# Patient Record
Sex: FEMALE | Race: WHITE | NOT HISPANIC OR LATINO | Employment: FULL TIME | URBAN - METROPOLITAN AREA
[De-identification: names, ages, dates, MRNs, and addresses within clinical notes are randomized per-mention and may not be internally consistent; named-entity substitution may affect disease eponyms.]

---

## 2017-03-25 ENCOUNTER — OFFICE VISIT (OUTPATIENT)
Dept: URGENT CARE | Age: 27
End: 2017-03-25
Payer: COMMERCIAL

## 2017-03-25 PROCEDURE — 99203 OFFICE O/P NEW LOW 30 MIN: CPT | Performed by: FAMILY MEDICINE

## 2017-03-25 PROCEDURE — S9088 SERVICES PROVIDED IN URGENT: HCPCS | Performed by: FAMILY MEDICINE

## 2017-06-02 ENCOUNTER — OFFICE VISIT (OUTPATIENT)
Dept: URGENT CARE | Age: 27
End: 2017-06-02
Payer: COMMERCIAL

## 2017-06-02 PROCEDURE — 99213 OFFICE O/P EST LOW 20 MIN: CPT | Performed by: FAMILY MEDICINE

## 2017-06-02 PROCEDURE — S9088 SERVICES PROVIDED IN URGENT: HCPCS | Performed by: FAMILY MEDICINE

## 2017-08-09 ENCOUNTER — APPOINTMENT (OUTPATIENT)
Dept: LAB | Facility: CLINIC | Age: 27
End: 2017-08-09
Payer: COMMERCIAL

## 2017-08-09 ENCOUNTER — ALLSCRIPTS OFFICE VISIT (OUTPATIENT)
Dept: OTHER | Facility: OTHER | Age: 27
End: 2017-08-09

## 2017-08-09 ENCOUNTER — TRANSCRIBE ORDERS (OUTPATIENT)
Dept: LAB | Facility: CLINIC | Age: 27
End: 2017-08-09

## 2017-08-09 DIAGNOSIS — Z00.8 HEALTH EXAMINATION IN POPULATION SURVEY: Primary | ICD-10-CM

## 2017-08-09 DIAGNOSIS — Z00.8 HEALTH EXAMINATION IN POPULATION SURVEY: ICD-10-CM

## 2017-08-09 LAB
CHOLEST SERPL-MCNC: 184 MG/DL (ref 50–200)
EST. AVERAGE GLUCOSE BLD GHB EST-MCNC: 111 MG/DL
HBA1C MFR BLD: 5.5 % (ref 4.2–6.3)
HDLC SERPL-MCNC: 84 MG/DL (ref 40–60)
LDLC SERPL CALC-MCNC: 95 MG/DL (ref 0–100)
TRIGL SERPL-MCNC: 27 MG/DL

## 2017-08-09 PROCEDURE — 83036 HEMOGLOBIN GLYCOSYLATED A1C: CPT

## 2017-08-09 PROCEDURE — 80061 LIPID PANEL: CPT

## 2017-08-09 PROCEDURE — 36415 COLL VENOUS BLD VENIPUNCTURE: CPT

## 2017-10-10 ENCOUNTER — ALLSCRIPTS OFFICE VISIT (OUTPATIENT)
Dept: OTHER | Facility: OTHER | Age: 27
End: 2017-10-10

## 2017-10-11 NOTE — PROGRESS NOTES
Assessment  1  Superficial laceration of face (910 8) (S01 81XA)    Discussion/Summary    laceration right face (just lateral to eye)  No sutures needed, steri strip only  Call for S/Sx infection, ongoing issues  Chief Complaint  Pt here c/o R eye injury      History of Present Illness  HPI: in appointment  Was rushing around too quickly this morning after she got out of bed, and accidentally hit the corner of her right eye on the edge of her dresser  Slight bleeding initially, fine since, but wanted to get it looked at just in case  Did not fall to ground or lose consciousness  No vision changes  Review of Systems    Integumentary: as noted in HPI  Active Problems  1  History of Erythema migrans (Lyme disease) (088 81) (A69 20)   2  Seasonal allergies (477 9) (J30 2)   3  Well female exam with routine gynecological exam (V72 31) (Z01 419)    Past Medical History  1  History of Erythema migrans (Lyme disease) (088 81) (A69 20)   2  History of acute pharyngitis (V12 69) (Z87 09)   3  History of postnasal drip (V12 69) (Z87 09)   4  History of Hordeolum of right eye (373 11) (H00 013)   5  History of Insect bite of upper extremity, right, subsequent encounter (V58 89,919 4)   (Z90 232D,M15  XXXD)   6  History of Need for MMR vaccine (V06 4) (Z23)   7  History of Pneumonia (V12 61)   8  Superficial laceration of face (910 8) (S01 81XA)   9  History of Swelling of eyelid, right (374 82) (A65 763)  Active Problems And Past Medical History Reviewed: The active problems and past medical history were reviewed and updated today  Family History  Father    1  Family history of Hypertension (V17 49)  Maternal Grandmother    2  Family history of skin cancer (V16 8) (Z80 8)   3  Family history of Hypertension (V17 49)   4  Family history of Lung Cancer (V16 1)  Maternal Grandfather    5  Family history of Laryngeal Cancer (V16 2)  Paternal Grandfather    10   Family history of lung cancer (V16 1) (Z80 1) 7  Family history of Hypertension (V17 49)    Social History   · Denied: History of Drug Use   · Exercises regularly   · Never A Smoker   · 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[employer]  · Social alcohol use (Z78 9)    Surgical History  1  History of Oral Surgery Tooth Extraction [facility]    Current Meds   1  No Reported Medications  Requested for: [id number] Recorded    Allergies  1  No Known Drug Allergies  2  No Known Environmental Allergies   3  No Known Food Allergies    Vitals   Recorded: [id number] 09:52AM   Temperature 98 7 F, Tympanic   Heart Rate 75   Systolic 933   Diastolic 72   Weight 091 lb    BMI Calculated 22 73   BSA Calculated 1 93   O2 Saturation 98     Physical Exam    Constitutional   General appearance: No acute distress, well appearing and well nourished  Head and Face   Head and face: Abnormal  -Right side of face, just lateral to right eye, with small (1 cm) very superficial laceration, without skin separation  Edges well approximated  Eyes   Conjunctiva and lids: No swelling, erythema or discharge  -Right eye with nontender, normal appearing lids  No tenderness/bruising to orbit, cheek  Pupils and irises: Equal, round, reactive to light  -Right eye with no signs of injury to cornea  No globe tenderness        Signatures   Electronically signed by : Marcellus Morrow; Oct 10 2017 10:14AM EST                       (Author)    Electronically signed by : Karen Chaney DO; Oct 10 2017 10:31AM EST                       (Author)

## 2018-01-14 VITALS
OXYGEN SATURATION: 98 % | SYSTOLIC BLOOD PRESSURE: 118 MMHG | DIASTOLIC BLOOD PRESSURE: 72 MMHG | TEMPERATURE: 98.7 F | HEART RATE: 75 BPM | WEIGHT: 163 LBS | BODY MASS INDEX: 22.73 KG/M2

## 2018-01-26 ENCOUNTER — OFFICE VISIT (OUTPATIENT)
Dept: FAMILY MEDICINE CLINIC | Facility: OTHER | Age: 28
End: 2018-01-26
Payer: COMMERCIAL

## 2018-01-26 VITALS
DIASTOLIC BLOOD PRESSURE: 78 MMHG | OXYGEN SATURATION: 99 % | SYSTOLIC BLOOD PRESSURE: 108 MMHG | BODY MASS INDEX: 22.48 KG/M2 | WEIGHT: 160.56 LBS | TEMPERATURE: 99.1 F | HEART RATE: 85 BPM | HEIGHT: 71 IN

## 2018-01-26 DIAGNOSIS — R22.2 NODULE OF CHEST WALL: Primary | ICD-10-CM

## 2018-01-26 PROCEDURE — 99213 OFFICE O/P EST LOW 20 MIN: CPT | Performed by: NURSE PRACTITIONER

## 2018-01-26 PROCEDURE — 3008F BODY MASS INDEX DOCD: CPT | Performed by: NURSE PRACTITIONER

## 2018-01-26 NOTE — PROGRESS NOTES
Assessment/Plan:         Diagnoses and all orders for this visit:    Nodule of chest wall  - Suspected benign (sebaceous) cyst, but offered ultrasound as reassurance  Otherwise, call if increases in size, tenderness, overlying skin changes  -  US abdomen limited; Future        Subjective:      Patient ID: Lurdes Orellana is a 33 y o  female  Here with complaints of small lump beneath skin overlying left lower anterior rib  First noticed a month ago  No known precipitating injury  Mildly tender at times  Persistent, no change in size noted  No overlying skin changes or drainage  No ice or heat applied  Had small cyst on her right collarbone a few years ago  No previous skin issues  The following portions of the patient's history were reviewed and updated as appropriate: allergies, current medications, past family history, past medical history, past social history, past surgical history and problem list     Review of Systems   Constitutional: Negative for fever  HENT: Negative for sore throat  Gastrointestinal: Negative for abdominal pain  Skin:        Per HPI         Objective:     Physical Exam   Constitutional: She appears well-developed and well-nourished  Abdominal: Soft  There is no tenderness     Skin:

## 2018-08-15 ENCOUNTER — APPOINTMENT (OUTPATIENT)
Dept: LAB | Facility: CLINIC | Age: 28
End: 2018-08-15
Payer: COMMERCIAL

## 2018-08-15 ENCOUNTER — TRANSCRIBE ORDERS (OUTPATIENT)
Dept: LAB | Facility: CLINIC | Age: 28
End: 2018-08-15

## 2018-08-15 ENCOUNTER — ANNUAL EXAM (OUTPATIENT)
Dept: OBGYN CLINIC | Facility: CLINIC | Age: 28
End: 2018-08-15
Payer: COMMERCIAL

## 2018-08-15 VITALS
HEIGHT: 71 IN | DIASTOLIC BLOOD PRESSURE: 70 MMHG | SYSTOLIC BLOOD PRESSURE: 118 MMHG | WEIGHT: 150.4 LBS | BODY MASS INDEX: 21.06 KG/M2

## 2018-08-15 DIAGNOSIS — Z00.8 HEALTH EXAMINATION IN POPULATION SURVEY: Primary | ICD-10-CM

## 2018-08-15 DIAGNOSIS — Z01.419 WELL WOMAN EXAM WITH ROUTINE GYNECOLOGICAL EXAM: Primary | ICD-10-CM

## 2018-08-15 DIAGNOSIS — Z00.8 HEALTH EXAMINATION IN POPULATION SURVEY: ICD-10-CM

## 2018-08-15 LAB
CHOLEST SERPL-MCNC: 176 MG/DL (ref 50–200)
EST. AVERAGE GLUCOSE BLD GHB EST-MCNC: 103 MG/DL
HBA1C MFR BLD: 5.2 % (ref 4.2–6.3)
HDLC SERPL-MCNC: 91 MG/DL (ref 40–60)
LDLC SERPL CALC-MCNC: 68 MG/DL (ref 0–100)
NONHDLC SERPL-MCNC: 85 MG/DL
TRIGL SERPL-MCNC: 83 MG/DL

## 2018-08-15 PROCEDURE — 99395 PREV VISIT EST AGE 18-39: CPT | Performed by: OBSTETRICS & GYNECOLOGY

## 2018-08-15 PROCEDURE — 83036 HEMOGLOBIN GLYCOSYLATED A1C: CPT

## 2018-08-15 PROCEDURE — 80061 LIPID PANEL: CPT

## 2018-08-15 PROCEDURE — 36415 COLL VENOUS BLD VENIPUNCTURE: CPT

## 2018-08-15 RX ORDER — MULTIVITAMIN
1 CAPSULE ORAL DAILY
COMMUNITY
End: 2022-05-25

## 2018-08-15 NOTE — PROGRESS NOTES
ASSESSMENT & PLAN: Reinier Duke is a 33 y o  Jennelle Rinne with normal gynecologic exam     1   Routine well woman exam done today  2  Pap:  The patient's last pap was   Pap was not done today  Current ASCCP Guidelines reviewed  3   STD testing  was not performed  She has never been sexually active  4  Gardasil recommendations reviewed and never received   5  The following were reviewed in today's visit: breast self exam, adequate intake of calcium and vitamin D, exercise and healthy diet  6  Patient ran a [employer] last september    CC:  Annual Gynecologic Examination    HPI: Reinier Duke is a 33 y o  Jennelle Rinne who presents for annual gynecologic examination  She has the following concerns:  none    Health Maintenance:    She exercises 7 days per week with cardio  She wears her seatbelt routinely  She does perform regular monthly self breast exams  She feels safe at home  Patients does not follow a regular diet  Her last pap was     Past Medical History:   Diagnosis Date    Acute pharyngitis     Last assessed 17    Hordeolum of right eye     Last assessed 17    Lyme disease     Lasst assessed 09/03/15    Postnasal drip     Lasst assessed 17       Past Surgical History:   Procedure Laterality Date    WISDOM TOOTH EXTRACTION         Past OB/Gyn History:  OB History      Para Term  AB Living    0 0 0 0 0 0    SAB TAB Ectopic Multiple Live Births    0 0 0 0 0      ,  Period Cycle (Days): 28  Period Duration (Days): 5  Period Pattern: Regular  Menstrual Flow: Moderate  Menstrual Control: Tampon  Dysmenorrhea: (!) Mild  Dysmenorrhea Symptoms: CrampingPatient's last menstrual period was 2018  History of sexually transmitted infection No  History of abnormal pap smears  No     Patient is not currently sexually active         Family History   Problem Relation Age of Onset    Hypertension Father     Skin cancer Maternal Grandmother     Hypertension Maternal Grandmother     Lung cancer Maternal Grandmother     Other Maternal Grandfather         Laryngeal cancer    Lung cancer Paternal Grandfather     Hypertension Paternal Grandfather     No Known Problems Mother        Social History:  Social History     Social History    Marital status: Single     Spouse name: N/A    Number of children: N/A    Years of education: N/A     Occupational History    Physical Therapist      Social History Main Topics    Smoking status: Never Smoker    Smokeless tobacco: Never Used    Alcohol use Yes      Comment: social    Drug use: No    Sexual activity: No     Other Topics Concern    Not on file     Social History Narrative    Exercise regularly     Presently lives with parents  Patient is single  Patient is currently employed [employer], physical therapy    No Known Allergies    Current Outpatient Prescriptions:     Multiple Vitamin (MULTIVITAMIN) capsule, Take 1 capsule by mouth daily, Disp: , Rfl:     Review of Systems:  A complete review of systems was performed and was negative, except as listed  none    Physical Exam:  /70 (BP Location: Left arm, Patient Position: Sitting, Cuff Size: Standard)   Ht 5' 11" (1 803 m)   Wt 68 2 kg (150 lb 6 4 oz)   LMP 08/11/2018   BMI 20 98 kg/m²    GEN: The patient was alert and oriented x3, pleasant well-appearing female in no acute distress  HEENT:  Unremarkable, no anterior or posterior lymphadenopathy, no thyromegaly  CV:  RRR, no murmurs  RESP:  Clear to auscultation bilaterally  BREAST:  Symmetric breasts with no palpable breast masses or obvious breast lesions  She has no retractions or nipple discharge  She has no axillary abnormalities or palpable masses  Self breast exam is taught    ABD:  Soft, nontender, nondistended, normoactive bowel sounds,   EXT:  WWP, nontender, no edema  BACK:  No CVA tenderness, no tenderness to palpation along spine  PELVIC:  Normal appearing external female genitalia, deferred internal exam  Never sexually active

## 2019-08-14 ENCOUNTER — OFFICE VISIT (OUTPATIENT)
Dept: FAMILY MEDICINE CLINIC | Facility: OTHER | Age: 29
End: 2019-08-14
Payer: COMMERCIAL

## 2019-08-14 ENCOUNTER — APPOINTMENT (OUTPATIENT)
Dept: LAB | Facility: CLINIC | Age: 29
End: 2019-08-14
Payer: COMMERCIAL

## 2019-08-14 VITALS
TEMPERATURE: 98 F | HEART RATE: 50 BPM | WEIGHT: 159.2 LBS | OXYGEN SATURATION: 97 % | HEIGHT: 71 IN | BODY MASS INDEX: 22.29 KG/M2 | DIASTOLIC BLOOD PRESSURE: 62 MMHG | SYSTOLIC BLOOD PRESSURE: 110 MMHG

## 2019-08-14 DIAGNOSIS — R53.82 CHRONIC FATIGUE: ICD-10-CM

## 2019-08-14 DIAGNOSIS — L65.9 HAIR LOSS: ICD-10-CM

## 2019-08-14 DIAGNOSIS — R53.82 CHRONIC FATIGUE: Primary | ICD-10-CM

## 2019-08-14 LAB
ALBUMIN SERPL BCP-MCNC: 4 G/DL (ref 3.5–5)
ALP SERPL-CCNC: 35 U/L (ref 46–116)
ALT SERPL W P-5'-P-CCNC: 27 U/L (ref 12–78)
ANION GAP SERPL CALCULATED.3IONS-SCNC: 6 MMOL/L (ref 4–13)
AST SERPL W P-5'-P-CCNC: 24 U/L (ref 5–45)
BASOPHILS # BLD AUTO: 0.04 THOUSANDS/ΜL (ref 0–0.1)
BASOPHILS NFR BLD AUTO: 1 % (ref 0–1)
BILIRUB SERPL-MCNC: 0.7 MG/DL (ref 0.2–1)
BUN SERPL-MCNC: 15 MG/DL (ref 5–25)
CALCIUM SERPL-MCNC: 9.4 MG/DL (ref 8.3–10.1)
CHLORIDE SERPL-SCNC: 100 MMOL/L (ref 100–108)
CO2 SERPL-SCNC: 28 MMOL/L (ref 21–32)
CREAT SERPL-MCNC: 0.7 MG/DL (ref 0.6–1.3)
EOSINOPHIL # BLD AUTO: 0.05 THOUSAND/ΜL (ref 0–0.61)
EOSINOPHIL NFR BLD AUTO: 1 % (ref 0–6)
ERYTHROCYTE [DISTWIDTH] IN BLOOD BY AUTOMATED COUNT: 12.8 % (ref 11.6–15.1)
FERRITIN SERPL-MCNC: 29 NG/ML (ref 8–388)
GFR SERPL CREATININE-BSD FRML MDRD: 118 ML/MIN/1.73SQ M
GLUCOSE SERPL-MCNC: 76 MG/DL (ref 65–140)
HCT VFR BLD AUTO: 39.5 % (ref 34.8–46.1)
HGB BLD-MCNC: 12.7 G/DL (ref 11.5–15.4)
IMM GRANULOCYTES # BLD AUTO: 0.02 THOUSAND/UL (ref 0–0.2)
IMM GRANULOCYTES NFR BLD AUTO: 0 % (ref 0–2)
IRON SATN MFR SERPL: 40 %
IRON SERPL-MCNC: 146 UG/DL (ref 50–170)
LYMPHOCYTES # BLD AUTO: 2.03 THOUSANDS/ΜL (ref 0.6–4.47)
LYMPHOCYTES NFR BLD AUTO: 29 % (ref 14–44)
MCH RBC QN AUTO: 29.5 PG (ref 26.8–34.3)
MCHC RBC AUTO-ENTMCNC: 32.2 G/DL (ref 31.4–37.4)
MCV RBC AUTO: 92 FL (ref 82–98)
MONOCYTES # BLD AUTO: 0.52 THOUSAND/ΜL (ref 0.17–1.22)
MONOCYTES NFR BLD AUTO: 7 % (ref 4–12)
NEUTROPHILS # BLD AUTO: 4.39 THOUSANDS/ΜL (ref 1.85–7.62)
NEUTS SEG NFR BLD AUTO: 62 % (ref 43–75)
NRBC BLD AUTO-RTO: 0 /100 WBCS
PLATELET # BLD AUTO: 226 THOUSANDS/UL (ref 149–390)
PMV BLD AUTO: 10.7 FL (ref 8.9–12.7)
POTASSIUM SERPL-SCNC: 3.8 MMOL/L (ref 3.5–5.3)
PROT SERPL-MCNC: 7.7 G/DL (ref 6.4–8.2)
RBC # BLD AUTO: 4.31 MILLION/UL (ref 3.81–5.12)
SODIUM SERPL-SCNC: 134 MMOL/L (ref 136–145)
TIBC SERPL-MCNC: 362 UG/DL (ref 250–450)
TSH SERPL DL<=0.05 MIU/L-ACNC: 2.16 UIU/ML (ref 0.36–3.74)
VIT B12 SERPL-MCNC: 367 PG/ML (ref 100–900)
WBC # BLD AUTO: 7.05 THOUSAND/UL (ref 4.31–10.16)

## 2019-08-14 PROCEDURE — 84630 ASSAY OF ZINC: CPT

## 2019-08-14 PROCEDURE — 83540 ASSAY OF IRON: CPT

## 2019-08-14 PROCEDURE — 3008F BODY MASS INDEX DOCD: CPT | Performed by: NURSE PRACTITIONER

## 2019-08-14 PROCEDURE — 1036F TOBACCO NON-USER: CPT | Performed by: NURSE PRACTITIONER

## 2019-08-14 PROCEDURE — 99214 OFFICE O/P EST MOD 30 MIN: CPT | Performed by: NURSE PRACTITIONER

## 2019-08-14 PROCEDURE — 82728 ASSAY OF FERRITIN: CPT

## 2019-08-14 PROCEDURE — 83550 IRON BINDING TEST: CPT

## 2019-08-14 PROCEDURE — 36415 COLL VENOUS BLD VENIPUNCTURE: CPT

## 2019-08-14 PROCEDURE — 84443 ASSAY THYROID STIM HORMONE: CPT

## 2019-08-14 PROCEDURE — 80053 COMPREHEN METABOLIC PANEL: CPT

## 2019-08-14 PROCEDURE — 82607 VITAMIN B-12: CPT

## 2019-08-14 PROCEDURE — 85025 COMPLETE CBC W/AUTO DIFF WBC: CPT

## 2019-08-14 RX ORDER — CALCIUM CARBONATE/VITAMIN D3 600 MG-10
1 TABLET ORAL
COMMUNITY

## 2019-08-14 NOTE — PROGRESS NOTES
Assessment/Plan:         Problem List Items Addressed This Visit     Chronic fatigue   Hair loss  --Iron deficiency anemia most likely, but will screen for other causes, calling patient with results  --Continue women's MVI (with iron) for now  Relevant Orders    CBC and differential    Comprehensive metabolic panel    Ferritin    Iron Panel (Includes Iron Saturation, Iron, and TIBC)    TSH, 3rd generation with Free T4 reflex    Vitamin B12    Zinc            Subjective:      Patient ID: Yokasta Bolden is a 30 y o  female  Here with concerns about anemia  Has tried to give blood a couple times over the past few years and was told that red count is mildly low  In addition, over the past few months she is feeling more tired than normal and has noted some mild thinning of her hair, along with mild diffuse hair loss  Gets chilled at times  Occasional lightheadedness when standing, but not worse than normal       No fevers, night sweats, weight loss, sore throat, cough,  rashes, headaches, tick bites, joint/muscle pains, weakness  Had Lyme disease in the past, but no recent suspicion  Adequate sun exposure  Normal appetite  No abdominal pain, C/D, melena, hematochezia  No urinary changes, frequent thirst    Normal periods (regular, not unusually heavy, no spotting)  No other known causes of her symptoms including recent illnesses, stressors, anxiety, depression, dietary changes  Normal lipids and A1C summer 2018  No recent CBC, iron levels  Eats quite healthy  Takes women's MVI  Active--works as physical therapist      Denies FH anemia, hypothyroidism      The following portions of the patient's history were reviewed and updated as appropriate: current medications, past family history, past medical history, past social history, past surgical history and problem list     Review of Systems   Constitutional: Negative for fatigue, fever and unexpected weight change     HENT: Negative for sore throat  Eyes: Negative for discharge  Respiratory: Negative for cough and shortness of breath  Cardiovascular: Negative for chest pain and palpitations  Gastrointestinal: Negative for abdominal pain, blood in stool, constipation, diarrhea, nausea and vomiting  Genitourinary: Negative for difficulty urinating  Musculoskeletal: Negative for arthralgias and myalgias  Skin: Negative for rash  Neurological: Negative for dizziness and headaches  Psychiatric/Behavioral: Negative for confusion  Objective:      /62 (BP Location: Left arm, Patient Position: Sitting, Cuff Size: Adult)   Pulse (!) 50   Temp 98 °F (36 7 °C) (Tympanic)   Ht 5' 11" (1 803 m)   Wt 72 2 kg (159 lb 3 2 oz)   SpO2 97%   BMI 22 20 kg/m²          Physical Exam   Constitutional: She is oriented to person, place, and time  She appears well-developed and well-nourished  HENT:   Head: Normocephalic  Right Ear: External ear normal    Left Ear: External ear normal    Nose: Nose normal    Mouth/Throat: Oropharynx is clear and moist    Eyes: Pupils are equal, round, and reactive to light  Conjunctivae are normal  No scleral icterus  Neck: Normal range of motion  Neck supple  No thyromegaly present  Cardiovascular: Normal rate, regular rhythm and normal heart sounds  Pulmonary/Chest: Effort normal and breath sounds normal    Abdominal: Soft  Bowel sounds are normal  There is no tenderness  Musculoskeletal: She exhibits no edema  Neurological: She is alert and oriented to person, place, and time  She has normal reflexes  Skin: Skin is warm and dry  Psychiatric: She has a normal mood and affect

## 2019-08-17 LAB — ZINC SERPL-MCNC: 74 UG/DL (ref 56–134)

## 2019-10-18 ENCOUNTER — ANNUAL EXAM (OUTPATIENT)
Dept: OBGYN CLINIC | Facility: CLINIC | Age: 29
End: 2019-10-18
Payer: COMMERCIAL

## 2019-10-18 VITALS
DIASTOLIC BLOOD PRESSURE: 66 MMHG | SYSTOLIC BLOOD PRESSURE: 104 MMHG | BODY MASS INDEX: 22.82 KG/M2 | WEIGHT: 163 LBS | HEIGHT: 71 IN

## 2019-10-18 DIAGNOSIS — Z12.4 SCREENING FOR MALIGNANT NEOPLASM OF CERVIX: ICD-10-CM

## 2019-10-18 DIAGNOSIS — Z01.419 WELL FEMALE EXAM WITH ROUTINE GYNECOLOGICAL EXAM: Primary | ICD-10-CM

## 2019-10-18 PROCEDURE — G0145 SCR C/V CYTO,THINLAYER,RESCR: HCPCS | Performed by: OBSTETRICS & GYNECOLOGY

## 2019-10-18 PROCEDURE — 99395 PREV VISIT EST AGE 18-39: CPT | Performed by: OBSTETRICS & GYNECOLOGY

## 2019-10-18 NOTE — PROGRESS NOTES
ASSESSMENT & PLAN: Manas Shoemaker is a 30 y o  Blinda Malady with normal gynecologic exam     1   Routine well woman exam done today  2    Pap and HPV:Pap with reflex HPV was done today  Current ASCCP Guidelines reviewed  Last Pap  2016 :  no abnormalities  3   The patient declined STD testing  Safe sex practices have been discussed  4  The patient is not sexually active  She declined contraception and options have been discussed  5  The following were reviewed in today's visit: breast self exam, mammography screening ordered, STD testing, family planning choices, exercise and healthy diet  6  Patient to return to office in 12 months for annual      All questions have been answered to her satisfaction  CC:  Annual Gynecologic Examination    HPI: Manas Shoemaker is a 30 y o  Blinda Malady who presents for annual gynecologic examination  She has the following concerns:  none    Health Maintenance:    She exercises 3 days per week  She wears her seatbelt routinely  She does perform regular monthly self breast exams  She feels safe at home  Patients does follow a healthy diet  Past Medical History:   Diagnosis Date    Acute pharyngitis     Last assessed 17    Hordeolum of right eye     Last assessed 17    Lyme disease     Lasst assessed 09/03/15    Postnasal drip     Lasst assessed 17       Past Surgical History:   Procedure Laterality Date    WISDOM TOOTH EXTRACTION         Past OB/Gyn History:  Period Cycle (Days): 28  Period Duration (Days): 1  Period Pattern: Regular  Menstrual Flow: Moderate  Dysmenorrhea: (!) Mild  Dysmenorrhea Symptoms: CrampingPatient's last menstrual period was 10/04/2019 (exact date)    Menstrual History:  OB History        0    Para   0    Term   0       0    AB   0    Living   0       SAB   0    TAB   0    Ectopic   0    Multiple   0    Live Births   0                  History of sexually transmitted infection No  Patient is currently sexually active  heterosexual Birth control: none  Last Pap  2016 :  no abnormalities      Family History   Problem Relation Age of Onset    Hypertension Father     Skin cancer Maternal Grandmother     Hypertension Maternal Grandmother     Lung cancer Maternal Grandmother     Other Maternal Grandfather         Laryngeal cancer    Lung cancer Paternal Grandfather     Hypertension Paternal Grandfather     No Known Problems Mother        Social History:  Social History     Socioeconomic History    Marital status: Single     Spouse name: Not on file    Number of children: Not on file    Years of education: Not on file    Highest education level: Not on file   Occupational History    Occupation: Physical Therapist   Social Needs    Financial resource strain: Not on file    Food insecurity:     Worry: Not on file     Inability: Not on file    Transportation needs:     Medical: Not on file     Non-medical: Not on file   Tobacco Use    Smoking status: Never Smoker    Smokeless tobacco: Never Used   Substance and Sexual Activity    Alcohol use: Yes     Comment: socially    Drug use: No    Sexual activity: Never   Lifestyle    Physical activity:     Days per week: Not on file     Minutes per session: Not on file    Stress: Not on file   Relationships    Social connections:     Talks on phone: Not on file     Gets together: Not on file     Attends Jainism service: Not on file     Active member of club or organization: Not on file     Attends meetings of clubs or organizations: Not on file     Relationship status: Not on file    Intimate partner violence:     Fear of current or ex partner: Not on file     Emotionally abused: Not on file     Physically abused: Not on file     Forced sexual activity: Not on file   Other Topics Concern    Not on file   Social History Narrative    Exercise regularly    Patient is currently employed - [employer]  No Known Allergies    Current Outpatient Medications:   co-enzyme Q-10 30 MG capsule, Take 30 mg by mouth 3 (three) times a day, Disp: , Rfl:     Multiple Vitamin (MULTIVITAMIN) capsule, Take 1 capsule by mouth daily, Disp: , Rfl:     Omega 3 1200 MG CAPS, Take 1 capsule by mouth, Disp: , Rfl:     Review of Systems:  Review of Systems   Constitutional: Negative  HENT: Negative  Respiratory: Negative  Cardiovascular: Negative  Gastrointestinal: Negative  Genitourinary: Negative  Neurological: Negative  Psychiatric/Behavioral: Negative  Physical Exam:  /66   Ht 5' 11" (1 803 m)   Wt 73 9 kg (163 lb)   LMP 10/04/2019 (Exact Date)   Breastfeeding? No   BMI 22 73 kg/m²    Physical Exam   Constitutional: She is oriented to person, place, and time  She appears well-developed and well-nourished  No distress  Genitourinary: Vagina normal and uterus normal  There is no rash, tenderness, lesion or injury on the right labia  There is no rash, tenderness, lesion or injury on the left labia  Vagina exhibits rugosity  No tenderness or bleeding in the vagina  No vaginal discharge found  Right adnexum does not display mass, does not display tenderness and does not display fullness  Left adnexum does not display mass, does not display tenderness and does not display fullness  Cervix is nulliparous  Cervix exhibits pinkness  Cervix does not exhibit motion tenderness or polyp  Uterus is mobile and retroverted  Uterus is not enlarged, tender or exhibiting a mass  Genitourinary Comments: Non tender bladder   HENT:   Head: Normocephalic and atraumatic  Cardiovascular: Normal rate, regular rhythm and normal heart sounds  No murmur heard  Pulmonary/Chest: Effort normal and breath sounds normal  No stridor  No respiratory distress  She has no wheezes  Right breast exhibits no inverted nipple, no mass, no nipple discharge, no skin change and no tenderness   Left breast exhibits no inverted nipple, no mass, no nipple discharge, no skin change and no tenderness  Abdominal: Soft  She exhibits no distension  There is no tenderness  There is no guarding  Neurological: She is alert and oriented to person, place, and time  Skin: Skin is warm and dry  She is not diaphoretic  No erythema  No pallor  Nursing note and vitals reviewed

## 2019-10-24 LAB
LAB AP GYN PRIMARY INTERPRETATION: NORMAL
Lab: NORMAL

## 2020-02-13 ENCOUNTER — TELEPHONE (OUTPATIENT)
Dept: URGENT CARE | Age: 30
End: 2020-02-13

## 2020-02-13 ENCOUNTER — OFFICE VISIT (OUTPATIENT)
Dept: URGENT CARE | Age: 30
End: 2020-02-13
Payer: COMMERCIAL

## 2020-02-13 VITALS
HEART RATE: 100 BPM | SYSTOLIC BLOOD PRESSURE: 126 MMHG | TEMPERATURE: 98.9 F | DIASTOLIC BLOOD PRESSURE: 72 MMHG | RESPIRATION RATE: 18 BRPM | OXYGEN SATURATION: 98 %

## 2020-02-13 DIAGNOSIS — J02.9 SORE THROAT: ICD-10-CM

## 2020-02-13 DIAGNOSIS — R68.89 FLU-LIKE SYMPTOMS: Primary | ICD-10-CM

## 2020-02-13 LAB
FLUAV RNA NPH QL NAA+PROBE: ABNORMAL
FLUBV RNA NPH QL NAA+PROBE: DETECTED
RSV RNA NPH QL NAA+PROBE: ABNORMAL
S PYO AG THROAT QL: NEGATIVE

## 2020-02-13 PROCEDURE — 87631 RESP VIRUS 3-5 TARGETS: CPT | Performed by: PHYSICIAN ASSISTANT

## 2020-02-13 PROCEDURE — G0382 LEV 3 HOSP TYPE B ED VISIT: HCPCS | Performed by: PHYSICIAN ASSISTANT

## 2020-02-13 PROCEDURE — 87880 STREP A ASSAY W/OPTIC: CPT | Performed by: PHYSICIAN ASSISTANT

## 2020-02-13 PROCEDURE — 87070 CULTURE OTHR SPECIMN AEROBIC: CPT | Performed by: PHYSICIAN ASSISTANT

## 2020-02-13 RX ORDER — OSELTAMIVIR PHOSPHATE 75 MG/1
75 CAPSULE ORAL EVERY 12 HOURS SCHEDULED
Qty: 10 CAPSULE | Refills: 0 | Status: SHIPPED | OUTPATIENT
Start: 2020-02-13 | End: 2020-02-13

## 2020-02-13 RX ORDER — OSELTAMIVIR PHOSPHATE 75 MG/1
75 CAPSULE ORAL EVERY 12 HOURS SCHEDULED
Qty: 10 CAPSULE | Refills: 0 | Status: SHIPPED | OUTPATIENT
Start: 2020-02-13 | End: 2020-02-18

## 2020-02-13 NOTE — PATIENT INSTRUCTIONS
Discussed influenza testing and treatment with Tamiflu at length, discussed side effects  Answered all questions  Please call tomorrow for influenza testing results  Discontinue Tamiflu if negative  Take antivirals as prescribed  Rapid strep negative, please call in 2 days for throat culture results  Stay hydrated with lots of water/fluids  Over-the-counter medications as needed for symptomatic treatment   Follow-up with PCP in the next 2-3 days for reexamination and to ensure resolution of symptoms  Go to the ED if any fevers, unable to stay hydrated, abdominal pain, chest pain, shortness of breath, wheezing, dizziness, headaches, fatigue/weakness, new or worsening symptoms or other concerning symptoms

## 2020-02-13 NOTE — PROGRESS NOTES
[facility]        NAME: Nishi Pierson is a 30 y o  female  : 1990    MRN: [id number]  DATE: 2020  TIME: 11:29 AM    Assessment and Plan   Flu-like symptoms [R68 89]  1  Flu-like symptoms  Influenza A/B and RSV by PCR    oseltamivir (TAMIFLU) 75 mg capsule    DISCONTINUED: oseltamivir (TAMIFLU) 75 mg capsule   2  Sore throat  POCT rapid strepA    Throat culture     Discussed testing for the flu and/or starting Tamiflu at length with the patient  Answered all questions  Patient requested flu testing and Tamiflu at this time  Rapid strep is negative, culture pending  Patient verbalized good understanding to follow up with family doctor or go to the emergency department if any new or worsening symptoms    Patient called in, she would prefer a paper prescription at this time she can take it to a different pharmacy  Pharmacy prescription was canceled, paper prescription was given to the patient per her request    Patient Instructions     Discussed influenza testing and treatment with Tamiflu at length, discussed side effects  Answered all questions  Please call tomorrow for influenza testing results  Discontinue Tamiflu if negative  Take antivirals as prescribed  Rapid strep negative, please call in 2 days for throat culture results  Stay hydrated with lots of water/fluids  Over-the-counter medications as needed for symptomatic treatment   Follow-up with PCP in the next 2-3 days for reexamination and to ensure resolution of symptoms  Go to the ED if any fevers, unable to stay hydrated, abdominal pain, chest pain, shortness of breath, wheezing, dizziness, headaches, fatigue/weakness, new or worsening symptoms or other concerning symptoms      Chief Complaint     Chief Complaint   Patient presents with    Cold Like Symptoms     x 2 , c/o fever , chill, boday aches and cough          History of Present Illness       29 y/o female presents with fever, chills, body aches and cough x Tuesday afetrnoon (patient still in 48 hour window)  States mild intermittent cough, sometimes dry, sometimes phlegmy of clearish phlegm  Some sick contacts with flu-like symptoms  Slight/mild sinus pressure headache- not the worse headache she has ever had  Feels similar to previous headaches  Mild intermittent sore throat which she thinks is mainly from the postnasal drip and coughing  States fevers where she felt "hot/cold" but did not take her temperature  Has been trying dayquil, nyquil and danna seltzer with some relief  Denies any vision changes, numbness, tingling, weakness, chest pain, shortness of breath, chest tightness, GI/ symptoms or other complaints  States eating and drinking normally, staying hydrated with a lot of fluids  Denies any past medical history  Denies any pregnancy risk  Denies any recent travel or switch to the corona virus  States she came for Tamiflu and flu testing  States she did receive the flu vaccine this year      Review of Systems   Review of Systems   Constitutional: Positive for chills and fever  Negative for activity change, appetite change and fatigue  HENT: Positive for congestion, postnasal drip, rhinorrhea and sore throat  Negative for ear pain, facial swelling, sinus pressure, trouble swallowing and voice change  Eyes: Negative for discharge, itching and visual disturbance  Respiratory: Positive for cough  Negative for chest tightness, shortness of breath and wheezing  Cardiovascular: Negative for chest pain  Gastrointestinal: Negative for abdominal pain, diarrhea, nausea and vomiting  Musculoskeletal: Positive for myalgias (Mild, generalized body aches)  Negative for back pain and neck pain  Skin: Negative for rash  Neurological: Negative for dizziness, syncope, weakness, numbness and headaches  All other systems reviewed and are negative          Current Medications       Current Outpatient Medications:     co-enzyme Q-10 30 MG capsule, Take 30 mg by mouth 3 (three) times a day, Disp: , Rfl:     Multiple Vitamin (MULTIVITAMIN) capsule, Take 1 capsule by mouth daily, Disp: , Rfl:     Omega 3 1200 MG CAPS, Take 1 capsule by mouth, Disp: , Rfl:     oseltamivir (TAMIFLU) 75 mg capsule, Take 1 capsule (75 mg total) by mouth every 12 (twelve) hours for 5 days, Disp: 10 capsule, Rfl: 0    Current Allergies     Allergies as of 02/13/2020    (No Known Allergies)            The following portions of the patient's history were reviewed and updated as appropriate: allergies, current medications, past family history, past medical history, past social history, past surgical history and problem list      Past Medical History:   Diagnosis Date    Acute pharyngitis     Last assessed 03/25/17    Hordeolum of right eye     Last assessed 06/02/17    Lyme disease     Lasst assessed 09/03/15    Postnasal drip     Lasst assessed 03/25/17       Past Surgical History:   Procedure Laterality Date    WISDOM TOOTH EXTRACTION         Family History   Problem Relation Age of Onset    Hypertension Father     Skin cancer Maternal Grandmother     Hypertension Maternal Grandmother     Lung cancer Maternal Grandmother     Other Maternal Grandfather         Laryngeal cancer    Lung cancer Paternal Grandfather     Hypertension Paternal Grandfather     No Known Problems Mother          Medications have been verified  Objective   /72   Pulse 100   Temp 98 9 °F (37 2 °C) (Temporal)   Resp 18   LMP 01/30/2020   SpO2 98%        Physical Exam     Physical Exam   Constitutional: She is oriented to person, place, and time  She appears well-developed and well-nourished  No distress  Smiling, talkative, nontoxic-appearing   HENT:   Head: Normocephalic and atraumatic  Right Ear: Tympanic membrane normal    Left Ear: Tympanic membrane normal    Mouth/Throat: Uvula is midline and mucous membranes are normal  No uvula swelling     Mild PND present with mildly erythematous posterior pharynx  No sinus pressure/discomfort to palpation  Airway patent, handling secretions  Eyes: Pupils are equal, round, and reactive to light  EOM are normal    No nystagmus, no pain with EOMs   Neck: Normal range of motion  Neck supple  Cardiovascular: Normal rate, regular rhythm and normal heart sounds  Pulmonary/Chest: Effort normal and breath sounds normal  No respiratory distress  She has no wheezes  Neurological: She is alert and oriented to person, place, and time  Skin: Capillary refill takes less than 2 seconds  Psychiatric: She has a normal mood and affect  Her behavior is normal    Nursing note and vitals reviewed

## 2020-02-14 NOTE — TELEPHONE ENCOUNTER
Called patient discussed positive influenza B results  Answered all questions  Patient is tolerating Tamiflu well  No current complaints    Verbalized good understanding to follow up with PCP or the ER sooner if any new or worsening symptoms

## 2020-02-15 LAB — BACTERIA THROAT CULT: NORMAL

## 2020-05-21 ENCOUNTER — OFFICE VISIT (OUTPATIENT)
Dept: PHYSICAL THERAPY | Facility: REHABILITATION | Age: 30
End: 2020-05-21
Payer: COMMERCIAL

## 2020-05-21 DIAGNOSIS — M62.89 PELVIC FLOOR DYSFUNCTION: ICD-10-CM

## 2020-05-21 DIAGNOSIS — N81.89 PELVIC FLOOR WEAKNESS: Primary | ICD-10-CM

## 2020-05-21 DIAGNOSIS — N39.3 STRESS INCONTINENCE: ICD-10-CM

## 2020-05-21 PROCEDURE — 97162 PT EVAL MOD COMPLEX 30 MIN: CPT | Performed by: PHYSICAL THERAPIST

## 2020-05-21 PROCEDURE — 97112 NEUROMUSCULAR REEDUCATION: CPT | Performed by: PHYSICAL THERAPIST

## 2020-06-08 ENCOUNTER — OFFICE VISIT (OUTPATIENT)
Dept: PHYSICAL THERAPY | Facility: REHABILITATION | Age: 30
End: 2020-06-08
Payer: COMMERCIAL

## 2020-06-08 DIAGNOSIS — M62.89 PELVIC FLOOR DYSFUNCTION: ICD-10-CM

## 2020-06-08 DIAGNOSIS — N81.89 PELVIC FLOOR WEAKNESS: Primary | ICD-10-CM

## 2020-06-08 DIAGNOSIS — N39.3 STRESS INCONTINENCE: ICD-10-CM

## 2020-06-08 PROCEDURE — 97112 NEUROMUSCULAR REEDUCATION: CPT | Performed by: PHYSICAL THERAPIST

## 2020-06-08 PROCEDURE — 90912 BFB TRAINING 1ST 15 MIN: CPT | Performed by: PHYSICAL THERAPIST

## 2020-06-29 ENCOUNTER — OFFICE VISIT (OUTPATIENT)
Dept: PHYSICAL THERAPY | Facility: REHABILITATION | Age: 30
End: 2020-06-29
Payer: COMMERCIAL

## 2020-06-29 DIAGNOSIS — N39.3 STRESS INCONTINENCE: ICD-10-CM

## 2020-06-29 DIAGNOSIS — M62.89 PELVIC FLOOR DYSFUNCTION: ICD-10-CM

## 2020-06-29 DIAGNOSIS — N81.89 PELVIC FLOOR WEAKNESS: Primary | ICD-10-CM

## 2020-06-29 PROCEDURE — 97140 MANUAL THERAPY 1/> REGIONS: CPT | Performed by: PHYSICAL THERAPIST

## 2020-06-29 PROCEDURE — 97112 NEUROMUSCULAR REEDUCATION: CPT | Performed by: PHYSICAL THERAPIST

## 2020-07-06 ENCOUNTER — OFFICE VISIT (OUTPATIENT)
Dept: PHYSICAL THERAPY | Facility: REHABILITATION | Age: 30
End: 2020-07-06
Payer: COMMERCIAL

## 2020-07-06 DIAGNOSIS — M62.89 PELVIC FLOOR DYSFUNCTION: ICD-10-CM

## 2020-07-06 DIAGNOSIS — N39.3 STRESS INCONTINENCE: ICD-10-CM

## 2020-07-06 DIAGNOSIS — N81.89 PELVIC FLOOR WEAKNESS: Primary | ICD-10-CM

## 2020-07-06 PROCEDURE — 97112 NEUROMUSCULAR REEDUCATION: CPT | Performed by: PHYSICAL THERAPIST

## 2020-07-06 PROCEDURE — 97110 THERAPEUTIC EXERCISES: CPT | Performed by: PHYSICAL THERAPIST

## 2020-07-06 PROCEDURE — 97140 MANUAL THERAPY 1/> REGIONS: CPT | Performed by: PHYSICAL THERAPIST

## 2020-07-06 NOTE — PROGRESS NOTES
Daily Note     Today's date: 2020  Patient name: Nathalie Chavez  : 1990  MRN: [id number]  Referring provider: Stephanie Mcgee PT  Dx:   Encounter Diagnosis     ICD-10-CM    1  Pelvic floor weakness N81 89    2  Pelvic floor dysfunction M62 89    3  Stress incontinence N39 3        Start Time: 1500  Stop Time: 1600  Total time in clinic (min): 60 minutes    Subjective: The patient states that she did a full body workout at the gym over the weekend  She was able to increase her weights by 5# and she noticed a difference in her soreness  She felt tight in her hips and pelvis  She is noticing that she is tense at rest and has difficulty actively relaxing her pelvic floor muscles  Objective: See treatment diary below      Assessment: Tolerated treatment well  Patient did demonstrate increased pelvic floor muscle tone especially in her perineal musclces today  She had increased tone on the right and demonstrated reduction in tone with manual therapy techniques  She did have an improved perineal range of motion and coordination with pelvic floor contraction after pelvic floor muscle releases  Plan: Continue per plan of care        Precautions: none  [employer] HEP:     Manuals IE                        Pelvic floor muscle contraction cueing    5 min         Pelvic floor muscle releases     10 min         Hands on assessment of PFM's and Hips   30 min          Neuro Re-Ed             Biofeedback assessment oF PFM's  10 min  10 min         Diaphragmatic Breathing             Happy Baby Pose with breathing  5 breaths x 5   5 breaths x 5         Child's Pose with diaphragmatic breathing  5 breaths x 5  5 breaths x 5                      Pelvic Floor Muscle Isolation:             PFMC Quick Flicks 59Q            PFMC Slow Holds 10x 15x  10x         Prone PFMC             Quadruped PFMC             Seated PFMC             Seated PFMC with pelvic tilt             Standing PFMC             TA ADIM 10x 10x           TA + PFMC             TA ADIM + hip abd isometrics             TA ADIM + hip adduction isometrics             PFMC + ecc bridge             Ther Ex             PPT             LTR             TA + clamshells             TA + bridge             Bridge + SLR             Bridge + marching             TA + Toe taps             Tband low rows + TA + PFMC             Piriformis stretch    20"x4 ea         Hip flexor stretch    15"x 3 ea         Hip flexor stretch with lateral trunk lean      15" x 2          Groin stretch on pball    10"x3 ea         Pelvic circles on pball    20x cw/ccw         Ther Activity             PFMC + sit to stand             PFMC + reaching             PFMC + lift             PFMC + cough             PFMC + step ups                          Gait Training                                       Modalities No

## 2020-07-21 ENCOUNTER — OFFICE VISIT (OUTPATIENT)
Dept: PHYSICAL THERAPY | Facility: REHABILITATION | Age: 30
End: 2020-07-21
Payer: COMMERCIAL

## 2020-07-21 DIAGNOSIS — N81.89 PELVIC FLOOR WEAKNESS: Primary | ICD-10-CM

## 2020-07-21 DIAGNOSIS — N39.3 STRESS INCONTINENCE: ICD-10-CM

## 2020-07-21 DIAGNOSIS — M62.89 PELVIC FLOOR DYSFUNCTION: ICD-10-CM

## 2020-07-21 PROCEDURE — 97110 THERAPEUTIC EXERCISES: CPT | Performed by: PHYSICAL THERAPIST

## 2020-07-21 PROCEDURE — 90913 BFB TRAINING EA ADDL 15 MIN: CPT | Performed by: PHYSICAL THERAPIST

## 2020-07-21 PROCEDURE — 97140 MANUAL THERAPY 1/> REGIONS: CPT | Performed by: PHYSICAL THERAPIST

## 2020-07-21 PROCEDURE — 90912 BFB TRAINING 1ST 15 MIN: CPT | Performed by: PHYSICAL THERAPIST

## 2020-07-21 NOTE — PROGRESS NOTES
Daily Note     Today's date: 2020  Patient name: Janet Buckley  : 1990  MRN: [id number]  Referring provider: Elizabeth Gauthier PT  Dx:   Encounter Diagnosis     ICD-10-CM    1  Pelvic floor weakness N81 89    2  Pelvic floor dysfunction M62 89    3  Stress incontinence N39 3        Start Time: 1200  Stop Time: 1300  Total time in clinic (min): 60 minutes    Subjective: The patient notes that she has not had any leakage or urgency while running on the treadmill at home  She has been stretching more frequently and notices more tightness on her right side  She has also been compliant with her pelvic floor exercises  She has the most difficulty in standing with letting go of her pelvic floor muscles after jose  Objective: See treatment diary below      Assessment: Tolerated treatment well  Patient had difficulty with full coordination of pelvic floor contractions and releasing today  She did have recruitment but it was limited in sitting, standing, and quadruped  Utilized Biofeedback in regards to touch  Had patient sit with hands inside of SITS bones to palpate pelvic floor contraction posteriorly and this was helpful  She did demonstrate increased pelvic floor muscle tension today upon assessment  Improved resting length in both the right and left pelvic floor muscles today with manual therapy techniques performed  She noted feeling some relief afterwards  No tenderness or pain reported  Also improved contraction and release after manual therapy  Plan: Continue per plan of care        Precautions: none  [facility] HEP:     Manuals IE                       Pelvic floor muscle contraction cueing    5 min         Pelvic floor muscle releases     10 min 15 min        Hands on assessment of PFM's and Hips   30 min          Neuro Re-Ed             Biofeedback assessment oF PFM's  10 min  10 min         Diaphragmatic Breathing             Happy Baby Pose with breathing  5 breaths x 5   5 breaths x 5         Child's Pose with diaphragmatic breathing  5 breaths x 5  5 breaths x 5 5 breaths x 5 with pball                     Pelvic Floor Muscle Isolation:             PFMC Quick Flicks 17M    09E        PFMC Slow Holds 10x 15x  10x 10x        Prone PFMC             Quadruped PFMC     5x        Seated PFMC             Seated PFMC with pelvic tilt     PPT  10x  QF's        Standing PFMC     5x QF's  5x  SH's        TA ADIM 10x 10x           TA + PFMC             TA ADIM + hip abd isometrics             TA ADIM + hip adduction isometrics             PFMC + ecc bridge             Ther Ex             PPT             LTR             TA + clamshells             TA + bridge             Bridge + SLR             Bridge + marching             TA + Toe taps             Tband low rows + TA + PFMC             Piriformis stretch    20"x4 ea         Hip flexor stretch    15"x 3 ea         Hip flexor stretch with lateral trunk lean      15" x 2          Groin stretch on pball    10"x3 ea 15"x3 ea        Pelvic circles on pball    20x cw/ccw 20x CW/CCW        Ther Activity             PFMC + sit to stand             PFMC + reaching             PFMC + lift             PFMC + cough             PFMC + step ups                          Gait Training                                       Modalities

## 2020-07-28 ENCOUNTER — OFFICE VISIT (OUTPATIENT)
Dept: PHYSICAL THERAPY | Facility: REHABILITATION | Age: 30
End: 2020-07-28
Payer: COMMERCIAL

## 2020-07-28 DIAGNOSIS — N81.89 PELVIC FLOOR WEAKNESS: Primary | ICD-10-CM

## 2020-07-28 DIAGNOSIS — M62.89 PELVIC FLOOR DYSFUNCTION: ICD-10-CM

## 2020-07-28 DIAGNOSIS — N39.3 STRESS INCONTINENCE: ICD-10-CM

## 2020-07-28 PROCEDURE — 97110 THERAPEUTIC EXERCISES: CPT | Performed by: PHYSICAL THERAPIST

## 2020-07-28 PROCEDURE — 90912 BFB TRAINING 1ST 15 MIN: CPT | Performed by: PHYSICAL THERAPIST

## 2020-07-28 PROCEDURE — 90913 BFB TRAINING EA ADDL 15 MIN: CPT | Performed by: PHYSICAL THERAPIST

## 2020-07-28 PROCEDURE — 97140 MANUAL THERAPY 1/> REGIONS: CPT | Performed by: PHYSICAL THERAPIST

## 2020-07-28 NOTE — PROGRESS NOTES
Daily Note     Today's date: 2020  Patient name: Lida Culp  : 1990  MRN: [id number]  Referring provider: Jane Andrews PT  Dx:   Encounter Diagnosis     ICD-10-CM    1  Pelvic floor weakness N81 89    2  Pelvic floor dysfunction M62 89    3  Stress incontinence N39 3        Start Time: 1000  Stop Time: 1100  Total time in clinic (min): 60 minutes    Subjective: The patient ran this morning and has been running 3 days a week on the treadmill and has had no leakage of urine and no urgency  She does empty prior to running which helps  Objective: See treatment diary below      Assessment: Tolerated treatment well  Patient continues to demonstrate some increased hypertonicity in her pelvic floor muslces, although improved from previously  She does well with exercises performed today  She had improved coordination and release of the contraction  She was most challenged with eccentric pelvic floor contraction with a bridge  Updated HEP today  Also talked to the patient about ordering a vaginal dilator to learn how to perform self pelvic floor muscle releases part of home program        Plan: Continue per plan of care        Precautions: none  [employer] HEP: [id number]      Manuals IE                      Pelvic floor muscle contraction cueing    5 min         Pelvic floor muscle releases     10 min 15 min 15 min       Hands on assessment of PFM's and Hips   30 min          Neuro Re-Ed             Biofeedback assessment oF PFM's  10 min  10 min         Diaphragmatic Breathing             Happy Baby Pose with breathing  5 breaths x 5   5 breaths x 5  5 breaths x5       Child's Pose with diaphragmatic breathing  5 breaths x 5  5 breaths x 5 5 breaths x 5 with pball 5 breaths x 5                    Pelvic Floor Muscle Isolation:             PFMC Quick Flicks 39Y    38W        PFMC Slow Holds 10x 15x  10x 10x        Prone PFMC             Quadruped PFMC     5x        Seated PFMC Seated PFMC with pelvic tilt     PPT  10x  QF's        Standing PFMC     5x QF's  5x  SH's        TA ADIM 10x 10x    10x       TA + PFMC             TA ADIM + hip abd isometrics      10x       TA ADIM + hip adduction isometrics      10x       PFMC + ecc bridge      10x       Ther Ex             PPT             LTR             TA + clamshells             TA + bridge             Bridge + SLR             Bridge + marching             TA + Toe taps             Tband low rows + TA + PFMC             Piriformis stretch    20"x4 ea         Hip flexor stretch    15"x 3 ea         Hip flexor stretch with lateral trunk lean      15" x 2          Groin stretch on pball    10"x3 ea 15"x3 ea        Pelvic circles on pball    20x cw/ccw 20x CW/CCW        Ther Activity             PFMC + sit to stand             PFMC + reaching             PFMC + lift             PFMC + cough             PFMC + step ups                          Gait Training                                       Modalities

## 2020-08-04 ENCOUNTER — OFFICE VISIT (OUTPATIENT)
Dept: PHYSICAL THERAPY | Facility: REHABILITATION | Age: 30
End: 2020-08-04
Payer: COMMERCIAL

## 2020-08-04 DIAGNOSIS — N39.3 STRESS INCONTINENCE: ICD-10-CM

## 2020-08-04 DIAGNOSIS — M62.89 PELVIC FLOOR DYSFUNCTION: ICD-10-CM

## 2020-08-04 DIAGNOSIS — N81.89 PELVIC FLOOR WEAKNESS: Primary | ICD-10-CM

## 2020-08-04 PROCEDURE — 97140 MANUAL THERAPY 1/> REGIONS: CPT | Performed by: PHYSICAL THERAPIST

## 2020-08-04 PROCEDURE — 97112 NEUROMUSCULAR REEDUCATION: CPT | Performed by: PHYSICAL THERAPIST

## 2020-08-04 PROCEDURE — 97110 THERAPEUTIC EXERCISES: CPT | Performed by: PHYSICAL THERAPIST

## 2020-08-04 NOTE — PROGRESS NOTES
Daily Note     Today's date: 2020  Patient name: Dano Juarez  : 1990  MRN: [id number]  Referring provider: Malachi Lamar PT  Dx:   Encounter Diagnosis     ICD-10-CM    1  Pelvic floor weakness  N81 89    2  Pelvic floor dysfunction  M62 89    3  Stress incontinence  N39 3        Start Time: 1200  Stop Time: 1300  Total time in clinic (min): 60 minutes    Subjective: The patient notes that she took a weekend course and sat almost all weekend for it  She was sore and she took breaks to stretch  She has been noticing improvements in her right hip mobility as well and has been able to perform deeper squats with more ease and comfort  She has not had any leakage or pain recently  She felt good after her previous treatment session  Objective: See treatment diary below      Assessment: Tolerated treatment well  Patient demonstrates increased pelvic floor muscle tone in the right and left pelvic floor muscles, worse on the right  Reduction in tone noted with manual therapy techniques today  She demonstrated resting rate WNL < 3 0 mV after manual therapy techniques  She had good recruitment of pelvic floor muscles between 12 0 and 15 0 mV on average with improved speed of release of the contraction as well  She is going to order a dilator so she can learn how to perform self pelvic floor muscle releases at home to help maintain proper length/tension relationship for better pelvic floor muscle function and support  Plan: Continue per plan of care        Precautions: none  [facility] HEP: [id number]      Manuals IE                     Pelvic floor muscle contraction cueing    5 min         Pelvic floor muscle releases     10 min 15 min 15 min 15 min      Hands on assessment of PFM's and Hips   30 min          QLO stretch in sidelying       5 min      Visceral sidelying mobilization       5 min      Neuro Re-Ed             Biofeedback assessment oF PFM's  10 min  10 min Diaphragmatic Breathing             Happy Baby Pose with breathing  5 breaths x 5   5 breaths x 5  5 breaths x5 5 breaths x 5      Child's Pose with diaphragmatic breathing  5 breaths x 5  5 breaths x 5 5 breaths x 5 with pball 5 breaths x 5 5 breaths x5                    Pelvic Floor Muscle Isolation:             PFMC Quick Flicks 84D    85L  5x3  supine  5x2 standing      PFMC Slow Holds 10x 15x  10x 10x  10x supine  10x standing      Prone PFMC             Quadruped PFMC     5x        Seated PFMC             Seated PFMC with pelvic tilt     PPT  10x  QF's        Standing PFMC     5x QF's  5x  SH's        TA ADIM 10x 10x    10x       TA + PFMC             TA ADIM + hip abd isometrics      10x       TA ADIM + hip adduction isometrics      10x       PFMC + ecc bridge      10x       Ther Ex             PPT             LTR             TA + clamshells             TA + bridge             Bridge + SLR             Bridge + marching             TA + Toe taps             Tband low rows + TA + PFMC             Piriformis stretch    20"x4 ea   20"x4 ea      Hip flexor stretch    15"x 3 ea         Hip flexor stretch with lateral trunk lean      15" x 2          Groin stretch on pball    10"x3 ea 15"x3 ea        Pelvic circles on pball    20x cw/ccw 20x CW/CCW        Ther Activity             PFMC + sit to stand             PFMC + reaching             PFMC + lift             PFMC + cough             PFMC + step ups                          Gait Training                                       Modalities

## 2020-09-02 ENCOUNTER — OFFICE VISIT (OUTPATIENT)
Dept: FAMILY MEDICINE CLINIC | Facility: CLINIC | Age: 30
End: 2020-09-02
Payer: COMMERCIAL

## 2020-09-02 VITALS
OXYGEN SATURATION: 98 % | TEMPERATURE: 99.1 F | HEART RATE: 74 BPM | DIASTOLIC BLOOD PRESSURE: 70 MMHG | WEIGHT: 165 LBS | HEIGHT: 71 IN | RESPIRATION RATE: 16 BRPM | BODY MASS INDEX: 23.1 KG/M2 | SYSTOLIC BLOOD PRESSURE: 120 MMHG

## 2020-09-02 DIAGNOSIS — D17.1 LIPOMA OF TORSO: ICD-10-CM

## 2020-09-02 DIAGNOSIS — Z00.00 PHYSICAL EXAM: Primary | ICD-10-CM

## 2020-09-02 LAB
SL AMB  POCT GLUCOSE, UA: NORMAL
SL AMB LEUKOCYTE ESTERASE,UA: NORMAL
SL AMB POCT BILIRUBIN,UA: NORMAL
SL AMB POCT BLOOD,UA: NORMAL
SL AMB POCT CLARITY,UA: CLEAR
SL AMB POCT COLOR,UA: YELLOW
SL AMB POCT KETONES,UA: NORMAL
SL AMB POCT NITRITE,UA: NORMAL
SL AMB POCT PH,UA: 7.5
SL AMB POCT SPECIFIC GRAVITY,UA: 1
SL AMB POCT URINE PROTEIN: NORMAL
SL AMB POCT UROBILINOGEN: 0.2

## 2020-09-02 PROCEDURE — 81003 URINALYSIS AUTO W/O SCOPE: CPT | Performed by: NURSE PRACTITIONER

## 2020-09-02 PROCEDURE — 99385 PREV VISIT NEW AGE 18-39: CPT | Performed by: NURSE PRACTITIONER

## 2020-09-02 NOTE — PROGRESS NOTES
FAMILY PRACTICE OFFICE VISIT       NAME: Ihsan Funez  AGE: 35 y o  SEX: female       : 1990        MRN: [id number]    Assessment and Plan     Problem List Items Addressed This Visit     None      Visit Diagnoses     Physical exam    -  Primary    Relevant Orders    POCT urine dip auto non-scope (Completed)    Lipoma of torso              1  Physical exam  POCT urine dip auto non-scope   2  Lipoma of torso        Healthy-appearing 64-year-old female presents today for annual physical exam and to establish care  Denies any past medical history  Past surgical history includes removal of a left temporal region cyst, and wisdom teeth extraction  Scheduled for next annual gynecological exam in October, with Dr Vogel  Pap is up-to-date  Tdap is up-to-date, will be due next year  Has annual influenza vaccination through her employer  Lipid panel in 2018 was in normal range with LDL 68, HDL excellent 91  Hemoglobin A1c in 2018 was 5 2%  She does exercise on a regular basis and overall eats a healthy well-balanced diet  In office urine dip is clear  Small lump near left last anterior rib, suspected lipoma  Recommended continued surveillance for now  If lump should increase in size, become tender, painful, she will call  She will follow-up in 1 year for annual physical, or sooner if needed  Chief Complaint     Chief Complaint   Patient presents with    Annual Exam     NP is here to est care and yearly physical       History of Present Illness     Ihsan Funez is a 35year old female presenting today to establish care  She denies any past medical history  Past surgical history includes a cyst removed from her left temporal region, and wisdom teeth extraction  She is a physical therapist working full time  Moved to this area 2 year ago  Exercises daily  Eats overall healthy well balanced diet  Single  No children    Follows with gyn, Dr Vogel   Annual gyn exam is scheduled in October  Has regular menstrual periods with no heavy bleeding, minimal cramping  Has regular dental exams  Has regular vision exams  Wears glasses for driving  Denies any problems with anxiety ear or depression, past or present  Recieves annual flu vaccine at work  Last Tdap in 2011  Has a pea-sized lump on left lower anterior rib, has been present for years  Not changing in size, consistency  Unable to be visualized, but palpable  Review of Systems   Review of Systems   Constitutional: Negative  HENT: Negative  Eyes: Negative  Respiratory: Negative  Cardiovascular: Negative  Gastrointestinal: Negative  Endocrine: Negative  Genitourinary: Negative  Musculoskeletal: Negative  Skin:        As noted in HPI   Allergic/Immunologic: Negative  Neurological: Negative  Hematological: Negative  Psychiatric/Behavioral: Negative  Active Problem List     Patient Active Problem List   Diagnosis    Seasonal allergies    Chronic fatigue    Hair loss       Past Medical History:  History reviewed  No pertinent past medical history      Past Surgical History:  Past Surgical History:   Procedure Laterality Date    CYST REMOVAL      left temple region    WISDOM TOOTH EXTRACTION         Family History:  Family History   Problem Relation Age of Onset    Hypertension Father     Skin cancer Maternal Grandmother     Hypertension Maternal Grandmother     Lung cancer Maternal Grandmother     Other Maternal Grandfather         Laryngeal cancer    Lung cancer Paternal Grandfather     Hypertension Paternal Grandfather     Stroke Paternal Grandfather     COPD Paternal Grandfather     No Known Problems Mother     No Known Problems Brother     Dementia Paternal Grandmother     Hypertension Paternal Grandmother     Kidney disease Paternal Grandmother        Social History:  Social History     Socioeconomic History    Marital status: Single     Spouse name: Not on file    Number of children: Not on file    Years of education: Not on file    Highest education level: Not on file   Occupational History    Occupation: Physical Therapist   Social Needs    Financial resource strain: Not on file    Food insecurity     Worry: Not on file     Inability: Not on file    Transportation needs     Medical: Not on file     Non-medical: Not on file   Tobacco Use    Smoking status: Never Smoker    Smokeless tobacco: Never Used   Substance and Sexual Activity    Alcohol use: Yes     Comment: socially    Drug use: No    Sexual activity: Never   Lifestyle    Physical activity     Days per week: Not on file     Minutes per session: Not on file    Stress: Not on file   Relationships    Social connections     Talks on phone: Not on file     Gets together: Not on file     Attends Cheondoism service: Not on file     Active member of club or organization: Not on file     Attends meetings of clubs or organizations: Not on file     Relationship status: Not on file    Intimate partner violence     Fear of current or ex partner: Not on file     Emotionally abused: Not on file     Physically abused: Not on file     Forced sexual activity: Not on file   Other Topics Concern    Not on file   Social History Narrative    Not on file       I have reviewed the patient's medical history in detail; there are no changes to the history as noted in the electronic medical record  Objective     Vitals:    09/02/20 1359   BP: 120/70   Pulse: 74   Resp: 16   Temp: 99 1 °F (37 3 °C)   TempSrc: Temporal   SpO2: 98%   Weight: 74 8 kg (165 lb)   Height: 5' 11" (1 803 m)     Wt Readings from Last 3 Encounters:   09/02/20 74 8 kg (165 lb)   10/18/19 73 9 kg (163 lb)   08/14/19 72 2 kg (159 lb 3 2 oz)     Physical Exam  Vitals signs and nursing note reviewed  Constitutional:       General: She is not in acute distress  Appearance: Normal appearance  She is well-developed and normal weight  She is not ill-appearing, toxic-appearing or diaphoretic  HENT:      Head: Normocephalic and atraumatic  Right Ear: Tympanic membrane and ear canal normal       Left Ear: Tympanic membrane and ear canal normal    Eyes:      Conjunctiva/sclera: Conjunctivae normal       Pupils: Pupils are equal, round, and reactive to light  Neck:      Musculoskeletal: Normal range of motion and neck supple  Thyroid: No thyromegaly  Cardiovascular:      Rate and Rhythm: Normal rate and regular rhythm  Heart sounds: No murmur  Pulmonary:      Effort: Pulmonary effort is normal  No respiratory distress  Breath sounds: Normal breath sounds  No wheezing or rales  Abdominal:      General: Bowel sounds are normal       Palpations: Abdomen is soft  Tenderness: There is no abdominal tenderness  Musculoskeletal: Normal range of motion  Lymphadenopathy:      Cervical: No cervical adenopathy  Skin:     Findings: No rash  Comments: Small, 5 mm mobile mass near left last anterior mid rib  There is no redness, or tenderness  Mass is rubbery like, mobile  Suspect small lipoma  Neurological:      Mental Status: She is alert and oriented to person, place, and time  Psychiatric:         Mood and Affect: Mood normal             ALLERGIES:  No Known Allergies    Current Medications     Current Outpatient Medications   Medication Sig Dispense Refill    co-enzyme Q-10 30 MG capsule Take 30 mg by mouth 3 (three) times a day      Multiple Vitamin (MULTIVITAMIN) capsule Take 1 capsule by mouth daily      Omega 3 1200 MG CAPS Take 1 capsule by mouth       No current facility-administered medications for this visit            Health Maintenance     Health Maintenance   Topic Date Due    HIV Screening  08/05/2006    Influenza Vaccine  07/01/2020    PT PLAN OF CARE  07/29/2020    Annual Physical  10/18/2020    Depression Screening PHQ  09/02/2021    BMI: Adult  09/02/2021    DTaP,Tdap,and Td Vaccines (7 - Td) 12/22/2021    Cervical Cancer Screening  10/18/2022    HIB Vaccine  Completed    Hepatitis B Vaccine  Completed    IPV Vaccine  Completed    Pneumococcal Vaccine: Pediatrics (0 to 5 Years) and At-Risk Patients (6 to 59 Years)  Aged Out    Hepatitis A Vaccine  Aged Out    Meningococcal ACWY Vaccine  Aged Out    HPV Vaccine  Aged [employer] History   Administered Date(s) Administered    DTaP 5 1990, 1990, 01/31/1992, 04/29/1993, 07/05/1996    Hep B, adult 05/22/1992, 06/19/1992, 11/30/1992    Hib (PRP-OMP) 1990, 1991, 01/31/1992, 11/04/1992    IPV 1990, 1991, 01/31/1992, 04/29/1993, 07/05/1996    Influenza Quadrivalent, 6-35 Months IM 10/31/2017    MMR 11/04/1992, 07/05/1996, 03/04/2013    Meningococcal, Unknown Serogroups 06/29/2005    Td (adult), adsorbed 08/24/2002    Tdap 12/22/2011    Tuberculin Skin Test-PPD Intradermal 06/24/2009    Varicella 07/14/1995, 08/19/2008       ALEX Andrews

## 2020-09-28 NOTE — PROGRESS NOTES
PT Discharge    Today's date: 2020  Patient name: Alexandr Price  : 1990  MRN: [id number]  Referring provider: Blessing Zeng PT  Dx:   Encounter Diagnosis     ICD-10-CM    1  Pelvic floor weakness  N81 89    2  Pelvic floor dysfunction  M62 89    3  Stress incontinence  N39 3        Start Time: 1200  Stop Time: 1300  Total time in clinic (min): 60 minutes    Assessment/Plan: The patient was treated for a total of 7 visits including her IE on   She has been doing well overall and will be discharged to [facility] at this time       Pelvic Floor Subjective     Objective

## 2020-10-30 ENCOUNTER — ANNUAL EXAM (OUTPATIENT)
Dept: OBGYN CLINIC | Facility: CLINIC | Age: 30
End: 2020-10-30
Payer: COMMERCIAL

## 2020-10-30 VITALS
HEIGHT: 72 IN | DIASTOLIC BLOOD PRESSURE: 66 MMHG | TEMPERATURE: 98.1 F | WEIGHT: 162.6 LBS | SYSTOLIC BLOOD PRESSURE: 102 MMHG | BODY MASS INDEX: 22.02 KG/M2

## 2020-10-30 DIAGNOSIS — Z01.419 WOMEN'S ANNUAL ROUTINE GYNECOLOGICAL EXAMINATION: Primary | ICD-10-CM

## 2020-10-30 PROCEDURE — 99395 PREV VISIT EST AGE 18-39: CPT | Performed by: OBSTETRICS & GYNECOLOGY

## 2020-12-23 ENCOUNTER — TELEPHONE (OUTPATIENT)
Dept: DERMATOLOGY | Facility: CLINIC | Age: 30
End: 2020-12-23

## 2020-12-29 ENCOUNTER — IMMUNIZATIONS (OUTPATIENT)
Dept: FAMILY MEDICINE CLINIC | Facility: HOSPITAL | Age: 30
End: 2020-12-29
Payer: COMMERCIAL

## 2020-12-29 DIAGNOSIS — Z23 ENCOUNTER FOR IMMUNIZATION: ICD-10-CM

## 2020-12-29 PROCEDURE — 0011A SARS-COV-2 / COVID-19 MRNA VACCINE (MODERNA) 100 MCG: CPT

## 2020-12-29 PROCEDURE — 91301 SARS-COV-2 / COVID-19 MRNA VACCINE (MODERNA) 100 MCG: CPT

## 2021-01-26 ENCOUNTER — IMMUNIZATIONS (OUTPATIENT)
Dept: FAMILY MEDICINE CLINIC | Facility: HOSPITAL | Age: 31
End: 2021-01-26

## 2021-01-26 DIAGNOSIS — Z23 ENCOUNTER FOR IMMUNIZATION: Primary | ICD-10-CM

## 2021-01-26 PROCEDURE — 0012A SARS-COV-2 / COVID-19 MRNA VACCINE (MODERNA) 100 MCG: CPT

## 2021-01-26 PROCEDURE — 91301 SARS-COV-2 / COVID-19 MRNA VACCINE (MODERNA) 100 MCG: CPT

## 2021-06-24 ENCOUNTER — OFFICE VISIT (OUTPATIENT)
Dept: DERMATOLOGY | Age: 31
End: 2021-06-24
Payer: COMMERCIAL

## 2021-06-24 VITALS — TEMPERATURE: 98.4 F | HEIGHT: 72 IN | BODY MASS INDEX: 21.67 KG/M2 | WEIGHT: 160 LBS

## 2021-06-24 DIAGNOSIS — D22.9 MULTIPLE BENIGN MELANOCYTIC NEVI: Primary | ICD-10-CM

## 2021-06-24 DIAGNOSIS — L70.0 ACNE VULGARIS: ICD-10-CM

## 2021-06-24 PROCEDURE — 99204 OFFICE O/P NEW MOD 45 MIN: CPT | Performed by: DERMATOLOGY

## 2021-06-24 RX ORDER — DOXYCYCLINE HYCLATE 100 MG/1
100 CAPSULE ORAL EVERY 12 HOURS SCHEDULED
Qty: 60 CAPSULE | Refills: 1 | Status: SHIPPED | OUTPATIENT
Start: 2021-06-24 | End: 2021-08-18

## 2021-06-24 RX ORDER — SPIRONOLACTONE 25 MG/1
25 TABLET ORAL DAILY
Qty: 30 TABLET | Refills: 2 | Status: SHIPPED | OUTPATIENT
Start: 2021-06-24 | End: 2021-08-03 | Stop reason: SDUPTHER

## 2021-06-24 NOTE — PROGRESS NOTES
[facility] Note     Patient Name: Shlomo Rose  Encounter Date: 06/24/2021     Have you been cared for by a [facility] in the last 3 years and, if so, which one? No    · Have you traveled outside of the [address] [place] [address] in the past 3 months or outside of the [facility] area in the last 2 weeks? No     May we call your Preferred Phone number to discuss your specific medical information? Yes     May we leave a detailed message that includes your specific medical information? Yes      Today's Chief Concerns:   Concern #1: Acne    Concern #2:  Moles     Past Medical History:  Have you personally ever had or currently have any of the following? · Skin cancer (such as Melanoma, Basal Cell Carcinoma, Squamous Cell Carcinoma? (If Yes, please provide more detail)- No  · Eczema: No  · Psoriasis: No  · HIV/AIDS: No  · Hepatitis B or C: No  · Tuberculosis: No  · Systemic Immunosuppression such as Diabetes, Biologic or Immunotherapy, Chemotherapy, Organ Transplantation, Bone Marrow Transplantation (If YES, please provide more detail): No  · Radiation Treatment (If YES, please provide more detail): No  · Any other major medical conditions/concerns? 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 Tracie barfield     Family History:  Have any of your "first degree relatives" (parent, brother, sister, or child) had any of the following       · Skin cancer such as Melanoma or Merkel Cell Carcinoma or Pancreatic Cancer? No  · Eczema, Asthma, Hay Fever or Seasonal Allergies: YES, Seasonal allergies brother and father   · Psoriasis or Psoriatic Arthritis: No  · Do any other medical conditions seem to run in your family? If Yes, what condition and which relatives?   No    Current Medications:       Current Outpatient Medications:     co-enzyme Q-10 30 MG capsule, Take 30 mg by mouth 3 (three) times a day, Disp: , Rfl:     Multiple Vitamin (MULTIVITAMIN) capsule, Take 1 capsule by mouth daily, Disp: , Rfl:     Omega 3 1200 MG CAPS, Take 1 capsule by mouth, Disp: , Rfl:       Review of Systems:  Have you recently had or currently have any of the following? If YES, what are you doing for the problem? · Fever, chills or unintended weight loss: No  · Sudden loss or change in your vision: No  · Nausea, vomiting or blood in your stool: No  · Painful or swollen joints: No  · Wheezing or cough: No  · Changing mole or non-healing wound: YES,   · Nosebleeds: No  · Excessive sweating: No  · Easy or prolonged bleeding? No  · Over the last 2 weeks, how often have you been bothered by the following problems? · Taking little interest or pleasure in doing things: 1 - Not at All  · Feeling down, depressed, or hopeless: 1 - Not at All  · Rapid heartbeat with epinephrine:  No    · FEMALES ONLY:    · Are you pregnant or planning to become pregnant? No  · Are you currently or planning to be nursing or breast feeding? No    · Any known allergies? · No Known Allergies      Physical Exam:     Was a chaperone (Derm Clinical Assistant) present throughout the entire Physical Exam? Yes     Did the Dermatology Team specifically  the patient on the importance of a Full Skin Exam to be sure that nothing is missed clinically?  Yes}  o Did the patient ultimately request or accept a Full Skin Exam?  NO  o Did the patient specifically refuse to have the areas "under-the-bra" examined by the Dermatologist? No  o Did the patient specifically refuse to have the areas "under-the-underwear" examined by the Dermatologist? No    CONSTITUTIONAL:   Vitals:    06/24/21 1611   Temp: 98 4 °F (36 9 °C)   TempSrc: Tympanic   Weight: 72 6 kg (160 lb)   Height: 5' 11 5" (1 816 m)         PSYCH: Normal mood and affect  EYES: Normal conjunctiva  ENT: Normal lips and oral mucosa  CARDIOVASCULAR: No edema  RESPIRATORY: Normal respirations  HEME/LYMPH/IMMUNO:  No regional lymphadenopathy except as noted below in "ASSESSMENT AND PLAN BY DIAGNOSIS"    SKIN:  FULL ORGAN SYSTEM EXAM   Hair, Scalp, Ears, Face Normal except as noted below in Assessment   Neck, Cervical Chain Nodes Normal except as noted below in Assessment   Right Arm/Hand/Fingers Normal except as noted below in Assessment   Left Arm/Hand/Fingers Normal except as noted below in Assessment   Chest/Breasts/Axillae Viewed areas Normal except as noted below in Assessment   Abdomen, Umbilicus Normal except as noted below in Assessment   Back/Spine Normal except as noted below in Assessment   Groin/Genitalia/Buttocks Normal except as noted below in Assessment   Right Leg, Foot, Toes Normal except as noted below in Assessment   Left Leg, Foot, Toes Normal except as noted below in Assessment        Assessment and Plan by Diagnosis:    History of Present Condition:     Duration:  How long has this been an issue for you?    o  1 year    Location Affected:  Where on the body is this affecting you?    o  face    Quality:  Is there any bleeding, pain, itch, burning/irritation, or redness associated with the skin lesion? o  Redness, painful, and itching    Severity:  Describe any bleeding, pain, itch, burning/irritation, or redness on a scale of 1 to 10 (with 10 being the worst)    o  3   Timing:  Does this condition seem to be there pretty constantly or do you notice it more at specific times throughout the day?    o  Denies   Context:  Have you ever noticed that this condition seems to be associated with specific activities you do?    o  Denies   Modifying Factors:    o Anything that seems to make the condition worse?    -  Denies  o What have you tried to do to make the condition better?    -  No dairy   - No gluten   - Beauty counter acne line    Associated Signs and Symptoms:  Does this skin lesion seem to be associated with any of the following:  o SL AMB DERM SIGNS AND SYMPTOMS: Redness and Itching and Scratching      1  MULTIPLE MELANOCYTIC NEVI ("Moles")     Physical Exam:  · Anatomic Location Affected: Trunk and extremities  · Morphological Description:  Scattered, round to ovoid, symmetrical-appearing, even bordered, skin colored to dark brown macules/papules  · Denies pain, itch, bleeding  No treatments tried  Present for years  Present constantly; no modifying factors which make it worse or better  Denies actively changing or growing moles  Assessment and Plan:  Based on a thorough discussion of this condition and the management approach to it (including a comprehensive discussion of the known risks, side effects and potential benefits of treatment), the patient (family) agrees to implement the following specific plan:  · Reassure benign  · Monitor for changes  · Use sun protection  Apply SPF 30 or higher at least three times a day  Wear sun protecting clothing and hats  Worrisome signs of skin malignancy discussed, questions answered  Regular self-skin check discussed  Advised to call or return to office if patient notices any spots of concern, rapidly growing/changing lesions, bleeding lesions, non-healing lesions  Advised regular SPF use  2  ACNE VULGARIS ("COMMON ACNE")    Physical Exam:   Psychiatric/Mood:   Anatomic Location Affected: Face    Morphological Description:  o Open/Closed Comedones:  - Several ("Moderate")  o Inflammatory Papules/Pustules:  - Many ("Severe")  o Nodules:  - Many ("Severe")  o Scarring:  - Several ("Moderate")  o Excoriations:  - Few ("Mild")  o Local Skin Redness/Erythema:  - Several ("Moderate")  o Local Skin Dryness/Scaling:  - Rare ("Almost Clear")  o Local Skin Dyspigmentation:  - Several ("Moderate")   Pertinent Positives:   Pertinent Negatives: Additional History of Present Condition:  Located on face  Presents for 1 year  Patient has tried beauty acne line   Patient has also tried being gluten and dairy free  Assessment and Plan:   We reviewed the causes of acne, the kinds of acne, and the expected clinical course   We discussed treatment options ranging from over-the-counter products, topical retinoids, antibiotics, BP, hormonal therapies (OCPs/spironolactone), and isotretinoin (Accutane)   We reviewed specific over-the-counter interventions and medications  Recommended typical hygiene measures including water-based facial products, washing regularly with mild cleanser, and refraining from picking and popping any pimples   Recommended non-comedogenic sunscreen use daily   Expectations of therapy discussed  Side effects, risks and benefits of medications discussed   A comprehensive handout on Acne was provided   The phone number to call in case of questions or concerns (and instructions to stop medications in such a scenario) was provided   After lengthy discussion of etiology and treatment options, we decided to implement the following personalized treatment plan:    Based on a thorough discussion of this condition and the management approach to it (including a comprehensive discussion of the known risks, side effects and potential benefits of treatment), the patient (family) agrees to implement the following specific plan:    --------------------------------------------------------------------------------------  YOUR PERSONALIZED ACNE ACTION PLAN    [address] [place] [address] Pkwy:  In the shower, wash your face, chest and back gently with Cetaphil moisturizing cleanser or Dove Fragrance-free bar  Do not use a luffa or washcloth as these tend to be too irritating to acne-prone skin        1) ANTIBIOTICS:     Doxycycline Take 1 tablet with a full glass of water and food      Start Spironolactone 25 mg take 1 tablet daily       Follow up in 2 months     EVENING ROUTINE    1) SKIN HYGIENE:  In the shower, wash your face, chest and back gently with Cetaphil moisturizing cleanser or Dove Fragrance-free bar  Do not use a lufa or washcloth as these tend to be too irritating to acne-prone skin  2) ANTIBIOTICS:     Doxycycline Take 1 tablet with a full glass of water and food     3) TOPICAL RETINOID:  At 1 hour before bedtime (after washing your face and allowing the skin to completely dry), spread only a single pea-sized amount of this medication evenly over your entire face (avoiding your eyes or mouth):   Tretinoin 0 025% micro cream one hour before bedtime        REMEMBER:  Always take your acne pills with lots of water! A pill stuck in your throat can cause significant burning and irritation  Drink a full glass of water to ensure the pill gets into your stomach  Avoid popping a pill right before bed, and stay upright for at least 1 hour after taking a pill  ACNE:  WHAT ZIT ALL ABOUT? WHY DO I HAVE ACNE/PIMPLES? Your skin is made of layers  To keep the skin from becoming dry and cracked, the skin needs oil  The oil is made in little wells in the deeper layers in the skin  People with acne have glands that make more oil and are more easily plugged, causing the glands to swell  Hormones, bacteria and your inherited tendency to have acne all play a role  The medical term for pimples is acne or acne vulgaris (vulgaris means common)  Most people get some acne  Acne does not come from being dirty  Instead, it is an expected consequence of changes that occur during normal growth and development  Hormones, bacteria, and your family's tendency to have acne may all play a role  Whiteheads or blackheads are openings of the glands (glands are the oil factories) onto the surface of the skin  Blackheads are not caused by dirt blocking the pores; instead, they result from the oxidation reaction of oil and skin in the pores with the air (like a rust reaction)  WHAT ABOUT STRESS? Stress does not cause acne but it can make it worse   Make sure you get enough sleep and daily exercise! WHAT ABOUT FOODS/DIET? Try to eat a balanced, healthy diet  Some people feel that certain foods worsen their acne  While there aren't many studies available on this question, severe dietary changes are unlikely to help your acne and may be harmful to the health of your skin  If you find that a certain food seems to aggravate your acne, you may consider avoiding that food  Discuss this with your physician! WHAT CAUSES MY ACNE? There are four contributors to acne--the body's natural oil (sebum), clogged pores, bacteria (with the scientific name Propionibacterium acnes, or P  acnes, for short), and the body's reaction to the bacteria living in the clogged pores (which causes inflammation)  Here's what happens:     Sebum is produced in the normal oil-making glands in the deeper layers of the skin and reaches the surface through the skin's pores  An increase in certain hormones occurs around the time of puberty, and these hormones trigger the oil glands to produce increased amounts of sebum   Pores with excess oil tend to become clogged more easily   At the same time, P  acnes--one of the many types of bacteria that normally live on everyone's skin--thrives in the excess oil and causes a skin reaction (inflammation)   If a pore is clogged close to the surface, there is little inflammation  However, this results in the formation of whiteheads (closed comedones) or blackheads (open comedones) at the surface of the skin   A plug that extends to, or forms a little deeper in the pore, or one that enlarges or ruptures may cause more inflammation  The result is red bumps (papules) and pus-filled pimples (pustules)   If plugging happens in the deepest skin layer, the inflammation may be even more severe, resulting in the formation of nodules or cysts  When these types of acne heal, they may leave behind discolored areas or true scars      SKIN HYGIENE:  HOW SHOULD I [address] MY SKIN? Acne does not come from being dirty, however, washing your face is part of taking good care of your skin and will help keep your face clear  Good skin hygiene is, therefore, critical to support any acne treatment plan  Here are several specific suggestions for practicing good skin hygiene and keeping your skin looking its best:     You should wash acne-prone skin TWICE A DAY: Once in the morning and once in the evening  This does include any showers you take that day, so do not overdo it!  Do not scrub the skin with a washcloth or loofah as these can irritate and inflame your acne  Acne does not come from dirt, so it is not necessary to scrub the skin clean  In fact, scrubbing may lead to dryness and irritation that makes the acne even worse and harder for patients to tolerate acne medications   Use a gentle facial moisturizing cleanser (Cetaphil Moisturizing Cleanser or Dove Fragrance-Free bar)  Avoid using soaps like Padmini Herrera, [address], [address], or soft/liquid soaps as these products will dry your skin   Do not use any over-the-counter acne washes without your doctor's specific instruction to do so  These products often contain salicylic acid or benzoyl peroxide  These ingredients can be helpful in clearing oil from the skin and reducing bacteria, but they may also be drying and can add to irritation   Do not use exfoliating products with microbeads or brushes as these can cause irritation to the skin   Facials and other treatments to remove, squeeze, or clean out pores are not recommended  Manipulating the skin in this way can make acne worse and can lead to severe infections and/or scarring  It also increases the likelihood that the skin will not be able to tolerate acne medications      Try not to pop pimples or pick at your acne as this can delay healing and may result in scarring or skin color changes (dark spots) that are often more noticeable than the acne itself  Picking/popping acne can also cause a serious skin infection   Wash or change your pillow case once to twice a week, especially if you use products in your hair   Wash the skin as soon as possible after playing sports or other activities that cause a lot of sweating  Also, pay attention to how your sports equipment (shoulder pads, helmet strap, etc ) might be making your acne worse   When you use makeup, moisturizer, or sunscreen make sure that these products are labeled non-comedogenic, or won't clog pores, or won't cause acne         SHOULD I TREAT MY ACNE? There are a number of other skin conditions that can look like acne  If there is any question about the diagnosis, then the person should be evaluated by a board certified pediatric and adolescent dermatologist   A physician should examine any child with acne who is between the ages of 3and 9years of age, as acne in this mid-childhood age group is not normal and may signal an underlying problem  If a preadolescent (9to 6years of age) or adolescent (15to 25years of age) has mild acne and the condition is not bothersome to the individual, proper and regular skin care (what your doctor may call skin hygiene) may be all that is needed at this point  Many people do, however, need specific acne medications to help their skin look and feel its best  Your doctor will tell you if you are one of these people  If so, you may be advised to use an over-the-counter or prescription medication that is applied to the skin (a topical medication) or if the addition of an oral medication (a medication taken by [employer]) is needed  The good news is that the medications work well when used properly! Some specific factors that may influence the choice of acne therapy include:     Severity  The number and type of skin lesions (papules or comedones) and the degree of inflammation (mild, moderate or severe)   Scarring   Scarring is most common when acne is severe, but it can happen even in children with mild acne   Impact  If a child is experiencing emotional complications because of the acne or is experiencing negative comments from other children   Cost of the acne medications  An acne expert can help to keep out of pocket costs to a minimum by utilizing the correct medications and the least expensive options   The patient's skin type (oily versus dry or combination skin, for example)   Potential side effects of the medication   The ease or overall complexity of the treatment plan or medication  WHAT ACNE TREATMENTS ARE AVAILABLE? Medications for acne try to stop the formation of new pimples by reducing or removing the oil, bacteria, and other things (like dead skin cells) that clog the pores  They can also decrease the inflammation or irritation response of the skin to bacteria  It may take from 6 to 8 weeks (about 2 months!) before you see any improvement and know if the medication is effective  It takes the layers of skin this long to regenerate  Remember, these medications do not cure the condition--the acne improves because of the medication  Therefore, treatment must be continued in order to prevent the return of acne lesions  There are many types of acne treatments  Some are applied to the skin (topical medications) and some are taken by mouth (oral medications)  In most cases of mild acne, the doctor will start with a topical medication  There are many different topical medications that are helpful for acne  If acne is more severe and it does not respond adequately to a topical medication, or if it covers large body surface areas such as the back and/or chest, oral antibiotics such as Doxycycline or Minocycline and/or oral hormone therapy such as Oral Contraceptive Pills or Spironolactone may be prescribed  In the most severe cases, isotretinoin (Accutane) may be used       In general, it is usually best to start with acne medications that are least likely to cause side effects but are at the same time capable of addressing the specific causes for the acne  Some patients have a good result with just one medication, but many will need to use a combination of treatments: two or more different topical agents or an oral medication plus a topical medication  Another treatment used for acne may include corticosteroid injections, which are used to help relieve pain, decrease the size, and encourage the healing of large, inflamed acne nodules  Also, dermatologists sometimes perform acne surgery, using a fine needle, a pointed blade, or an instrument known as a comedone extractor to mechanically clean out clogged pores  One must always weigh the risk for inducing a scar with the potential benefits of any procedure  Prior treatment with topical retinoids can loosen whiteheads and blackheads and make it easier to physically remove such lesions  Heat-based devices, and light and laser therapy are being studied to see whether there is any role for such treatments in mild to moderate acne  At this time, there is not enough evidence to make general recommendations about their use  TOPICAL ACNE MEDICATIONS    WHAT KIND OF TOPICALS ARE THERE?  Benzoyl peroxide (BP) helps to fight inflammation and is anti-microbial (kills bacteria, viruses, and other microorganisms) and is believed to help prevent resistance of bacteria to topical antibiotics  A benzoyl peroxide wash may be recommended for use on large areas such as the chest and/or back  Mild irritation and dryness are common when first using benzoyl peroxide-containing products  Be careful because benzoyl peroxide can bleach towels and clothing!  Retinoids (such as adapalene, tretinoin, or tazarotene) unplug the oil glands by helping peel away the layers of skin and other things plugging the opening of the glands   Mild irritation and dryness are common when first using these products  Facial waxing and other skin procedures can lead to excessive irritation and should be avoided during retinoid therapy   Antibiotics fight bacteria and help decrease inflammation  Topical antibiotics commonly used in acne include clindamycin, erythromycin, and combination agents (such as clindamycin/benzoyl peroxide or erythromycin/benzoyl peroxide)  Mild irritation and dryness are common when first using these products  Typically, topical antibiotics should not be used alone as treatment for acne   Other topical agents include salicylic acid, azelaic acid, dapsone, and sulfacetamide  Mild irritation and dryness can also occur when first using these products  USING YOUR TOPICAL TREATMENTS LIKE A PRO   Apply topical medications only to clean, dry skin  Topical medications may lead to significant dryness of the affected areas  To minimize this, wait 15-20 minutes after washing before applying your topical medication   These medications work deep in the skin to prevent new breakouts  Spot treatment of individual pimples does not do much  When applying topical medications to the face, use the 5-dot method  Start by placing a small pea-sized amount of the medication on your finger  Then, place dots in each of five locations of your face: Mid-forehead, each cheek, nose, and chin  Next, rub the medication into the entire area of skin - not just on individual pimples! Try to avoid the delicate skin around your eyes and corners of your mouth   The medications are not magic! They take weeks if not months to work  Be patient and use your medicine on a daily basis or as directed for six weeks before asking if your skin looks better  Try not to miss more than one or two days each week when using your medications   If you are starting a new medication, then try using it every other night or even every third night   Gradually work up to [employer] a day    This will give your skin time to adjust    The same medications often come in various forms or formulations: Creams, ointments, lotions, gels, microspheres, or foams  Use the formulation that has been recommended and don't switch to other forms unless instructed  Some forms (such as alcohol based gels) may be more drying and less tolerable for certain skin types   Sometimes individual medications are not as effective as a combination of two or more agents  The doctor may need to try several medications or combinations before finding the one that is best for that patient   Moisturizer, sunscreen, and make-up may be used in conjunction with topical acne medications  In general, acne medications are applied first so they may directly contact the skin  Ask your physician to review specific application instructions!  It is especially important to always use sunscreen when using a topical retinoid or oral antibiotic  These drugs can make your skin more sensitive to the sun  In general, sunscreen gets applied AFTER any acne medications   Don't stop using your acne medications just because your acne got better  Remember, the acne is better because of the medication, and prevention is the sanchez to treatment  ORAL ACNE MEDICATIONS    ORAL ANTIBIOTICS  Antibiotics include tetracycline-class medicines (which include the most commonly used oral antibiotics for acne, minocycline, and doxycycline), erythromycin, trimethoprim-sulfamethoxazole, and occasionally cephalexin or azithromycin  These drugs may decrease bacteria and inflammation, and they are most effective for moderate-to-severe inflammatory acne  A product containing benzoyl peroxide should be used along with these antibiotics to help decrease the possibility of microbial resistance  Always take your acne pills with lots of water! A pill stuck in your throat can cause significant burning and irritation  Drink a full glass of water to ensure the pill gets into your stomach    Avoid popping a pill right before bed, and stay upright for at least 1 hour after taking a pill  DOXYCYCLINE   This medication is usually taken ONCE or TWICE per day, as instructed by your physician  NOTE: Always take this medication with lots of water! A pill stuck in the throat can cause significant burning and irritation  Avoid popping a pill right before bed & stay upright for at least one hour after taking a pill  WARNING: Doxycycline increases your sensitivity to the sun, so practice excellent sun protection! If you notice any of the following, stop using the medication and notify your health care provider: headaches; blurred vision; dizziness; sun sensitivity; heartburn-stomach pain; irritation of the esophagus; darkening of scars, gums, or teeth (more often with minocycline); nail changes; yellowing of the eyes or skin (indicating possible liver disease); joint pains-and flu-like symptoms  Taking oral antibiotics with food may help with symptoms of upset stomach  COMMON SIDE EFFECTS: Headaches; dizziness; sun sensitivity; irritation of the throat; discoloration of scars, gums, or teeth; nail changes  MINOCYCLINE   This medication is usually taken ONCE or TWICE per day, as instructed by your physician  NOTE: Always take this medication with lots of water! A pill stuck in the throat can cause significant burning and irritation  Avoid popping a pill right before bed & stay upright for at least one hour after taking a pill  WARNING: Though less likely than doxycycline, minocycline may increase your sensitivity to the sun, so practice excellent sun protection!  If you notice any of the following, stop using the medication and notify your health care provider: headaches; blurred vision; dizziness; sun sensitivity; heartburn-stomach pain; irritation of the throat; darkening of scars, gums, or teeth; nail changes; yellowing of the eyes or skin (indicating possible liver disease); joint pains-and flu-like symptoms  Taking oral antibiotics with food may help with symptoms of upset stomach  COMMON SIDE EFFECTS: Headaches; dizziness; sun sensitivity; irritation of the throat; discoloration of scars, gums, or teeth (often with minocycline); nail changes  Minocycline can rarely cause liver disease, joint pains, severe skin rashes, and flu-like symptoms  If you should notice yellowing of the eyes or skin, or any of the above, notify your doctor and stop using the medication immediately  HORMONAL THERAPY  Hormonal treatment is used only in females and usually consists of oral contraceptives (birth control pills)  Spironolactone is also sometimes used  ORAL CONTRACEPTIVE PILLS   This medication is also known as the Birth Control Pill    We use it for hormonal regulation of acne  Take this medication as directed on the medication packet  NOTE: Try to find a regular time in your day to take the pill so that you don't forget  The best time is about half an hour after a meal or snack, or at bedtime  If you do forget to take your daily pill at the regular time, take one as soon as you remember and take the next at your regular scheduled time  WARNING: Do not take this medication until discussing it with your physician if you smoke, are pregnant (or trying to become pregnant or could be pregnant), have a personal history of breast cancer, have any artificial hardware or implants, have a condition called Factor 5 Leiden deficiency, have a family history of clotting problems, regularly have migraine headaches (especially with aura or due to flashing lights), or have any vaginal bleeding other than that associated with your menstrual cycle  ORAL ISOTRETINOIN (used to be called the brand name Mitzi Michele)  Isotretinoin, a derivative of vitamin A, is a powerful drug with several significant potential side effects  It is reserved for acne which is severe or when other medications have not worked well enough  It used to be sold under the brand name Accutane but now several versions exist       HAVING PROBLEMS WITH ANY OF YOUR TREATMENTS? You should not be able to see any of the medicines on your face  If you can see a white film on your skin after you apply the medication, there is too much medicine in that area and you need to apply a thinner coat and make sure it is spread evenly on your face  If your skin gets too dry, you can apply a light (non-comedogenic) moisturizer on top of your medicine or you may switch to using the medicine every other day instead of every day  If your skin is still too irritated, you may need to switch to a milder medication  If your skin is red and very itchy, you may be allergic to the medication and you should stop using it  COMMON POSSIBLE SIDE EFFECTS OF MEDICATIONS     Retinoids - dryness, redness, increased sun sensitivity   Benzoyl peroxide - drying, redness, bleaching of clothes, towels and sheets, allergy   Doxycycline - headaches; dizziness; irritation of the throat; nail changes; discoloration of teeth   Sun sensitivity - even if you have dark skin, this medicine can make you burn more easily  Make sure you protect yourself from the sun, either by avoiding being outside between 11 AM and 3 PM, wearing and reapplying sunscreen/sunblock, or wearing sun protective clothing   Nausea/vomiting - if you experience nausea with this medication, take it with food   Minocycline - headaches; dizziness; vision problems,  irritation of the throat; discoloration of scars, gums, or teeth  Can rarely cause liver disease, joint pains, and flu-like symptoms       If you should notice yellowing of the skin or any of the above, notify your doctor and stop using the medication   Birth Control Pills - nausea; headaches; breast tenderness; feeling bloated; mood changes   Spotting between periods may occur for the first three weeks of the medication, but this is not serious  It may last for two or three cycles  Please call us if the bleeding is heavier than a light flow or lasts for more than a few days  WHEN AND WHERE TO CALL WITH CONCERNS  We are here to help! If you experience any unusual symptoms, then stop taking or using the medication and call our office at [phone]  It is better to be safe than to be sorry!                                                       Scribe Attestation    I,:  Anna Mi am acting as a scribe while in the presence of the attending physician :       I,:  Melina Jones MD personally performed the services described in this documentation    as scribed in my presence :

## 2021-06-24 NOTE — PATIENT INSTRUCTIONS
1  MULTIPLE MELANOCYTIC NEVI ("Moles")     Assessment and Plan:  Based on a thorough discussion of this condition and the management approach to it (including a comprehensive discussion of the known risks, side effects and potential benefits of treatment), the patient (family) agrees to implement the following specific plan:  · Reassure benign  · Monitor for changes  · Use sun protection  Apply SPF 30 or higher at least three times a day  Wear sun protecting clothing and hats  Worrisome signs of skin malignancy discussed, questions answered  Regular self-skin check discussed  Advised to call or return to office if patient notices any spots of concern, rapidly growing/changing lesions, bleeding lesions, non-healing lesions  Advised regular SPF use  2  ACNE VULGARIS ("COMMON ACNE")    --------------------------------------------------------------------------------------  YOUR PERSONALIZED ACNE ACTION PLAN    MORNING ROUTINE    SKIN HYGIENE:  In the shower, wash your face, chest and back gently with Cetaphil moisturizing cleanser or Dove Fragrance-free bar  Do not use a luffa or washcloth as these tend to be too irritating to acne-prone skin  1) ANTIBIOTICS:     Doxycycline Take 1 tablet with a full glass of water and food      Start Spironolactone 25 mg take 1 tablet daily       Follow up in 2 months     EVENING ROUTINE    1) SKIN HYGIENE:  In the shower, wash your face, chest and back gently with Cetaphil moisturizing cleanser or Dove Fragrance-free bar  Do not use a lufa or washcloth as these tend to be too irritating to acne-prone skin      2) ANTIBIOTICS:     Doxycycline Take 1 tablet with a full glass of water and food     3) TOPICAL RETINOID:  At 1 hour before bedtime (after washing your face and allowing the skin to completely dry), spread only a single pea-sized amount of this medication evenly over your entire face (avoiding your eyes or mouth):   Tretinoin 0 025% micro cream one hour before bedtime        REMEMBER:  Always take your acne pills with lots of water! A pill stuck in your throat can cause significant burning and irritation  Drink a full glass of water to ensure the pill gets into your stomach  Avoid popping a pill right before bed, and stay upright for at least 1 hour after taking a pill  ACNE:  WHAT ZIT ALL ABOUT? WHY DO I HAVE ACNE/PIMPLES? Your skin is made of layers  To keep the skin from becoming dry and cracked, the skin needs oil  The oil is made in little wells in the deeper layers in the skin  People with acne have glands that make more oil and are more easily plugged, causing the glands to swell  Hormones, bacteria and your inherited tendency to have acne all play a role  The medical term for pimples is acne or acne vulgaris (vulgaris means common)  Most people get some acne  Acne does not come from being dirty  Instead, it is an expected consequence of changes that occur during normal growth and development  Hormones, bacteria, and your family's tendency to have acne may all play a role  Whiteheads or blackheads are openings of the glands (glands are the oil factories) onto the surface of the skin  Blackheads are not caused by dirt blocking the pores; instead, they result from the oxidation reaction of oil and skin in the pores with the air (like a rust reaction)  WHAT ABOUT STRESS? Stress does not cause acne but it can make it worse  Make sure you get enough sleep and daily exercise! WHAT ABOUT FOODS/DIET? Try to eat a balanced, healthy diet  Some people feel that certain foods worsen their acne  While there aren't many studies available on this question, severe dietary changes are unlikely to help your acne and may be harmful to the health of your skin  If you find that a certain food seems to aggravate your acne, you may consider avoiding that food  Discuss this with your physician! WHAT CAUSES MY ACNE?   There are four contributors to acne--the body's natural oil (sebum), clogged pores, bacteria (with the scientific name Propionibacterium acnes, or P  acnes, for short), and the body's reaction to the bacteria living in the clogged pores (which causes inflammation)  Here's what happens:     Sebum is produced in the normal oil-making glands in the deeper layers of the skin and reaches the surface through the skin's pores  An increase in certain hormones occurs around the time of puberty, and these hormones trigger the oil glands to produce increased amounts of sebum   Pores with excess oil tend to become clogged more easily   At the same time, P  acnes--one of the many types of bacteria that normally live on everyone's skin--thrives in the excess oil and causes a skin reaction (inflammation)   If a pore is clogged close to the surface, there is little inflammation  However, this results in the formation of whiteheads (closed comedones) or blackheads (open comedones) at the surface of the skin   A plug that extends to, or forms a little deeper in the pore, or one that enlarges or ruptures may cause more inflammation  The result is red bumps (papules) and pus-filled pimples (pustules)   If plugging happens in the deepest skin layer, the inflammation may be even more severe, resulting in the formation of nodules or cysts  When these types of acne heal, they may leave behind discolored areas or true scars  SKIN HYGIENE:  HOW SHOULD I [address] MY SKIN? Acne does not come from being dirty, however, washing your face is part of taking good care of your skin and will help keep your face clear  Good skin hygiene is, therefore, critical to support any acne treatment plan  Here are several specific suggestions for practicing good skin hygiene and keeping your skin looking its best:     You should wash acne-prone skin TWICE A DAY: Once in the morning and once in the evening   This does include any showers you take that day, so do not overdo it!  Do not scrub the skin with a washcloth or loofah as these can irritate and inflame your acne  Acne does not come from dirt, so it is not necessary to scrub the skin clean  In fact, scrubbing may lead to dryness and irritation that makes the acne even worse and harder for patients to tolerate acne medications   Use a gentle facial moisturizing cleanser (Cetaphil Moisturizing Cleanser or Dove Fragrance-Free bar)  Avoid using soaps like [address], [address] [facility], or soft/liquid soaps as these products will dry your skin   Do not use any over-the-counter acne washes without your doctor's specific instruction to do so  These products often contain salicylic acid or benzoyl peroxide  These ingredients can be helpful in clearing oil from the skin and reducing bacteria, but they may also be drying and can add to irritation   Do not use exfoliating products with microbeads or brushes as these can cause irritation to the skin   Facials and other treatments to remove, squeeze, or clean out pores are not recommended  Manipulating the skin in this way can make acne worse and can lead to severe infections and/or scarring  It also increases the likelihood that the skin will not be able to tolerate acne medications   Try not to pop pimples or pick at your acne as this can delay healing and may result in scarring or skin color changes (dark spots) that are often more noticeable than the acne itself  Picking/popping acne can also cause a serious skin infection   Wash or change your pillow case once to twice a week, especially if you use products in your hair   Wash the skin as soon as possible after playing sports or other activities that cause a lot of sweating  Also, pay attention to how your sports equipment (shoulder pads, helmet strap, etc ) might be making your acne worse     When you use makeup, moisturizer, or sunscreen make sure that these products are labeled non-comedogenic, or won't clog pores, or won't cause acne         SHOULD I TREAT MY ACNE? There are a number of other skin conditions that can look like acne  If there is any question about the diagnosis, then the person should be evaluated by a board certified pediatric and adolescent dermatologist   A physician should examine any child with acne who is between the ages of 3and 9years of age, as acne in this mid-childhood age group is not normal and may signal an underlying problem  If a preadolescent (9to 6years of age) or adolescent (15to 25years of age) has mild acne and the condition is not bothersome to the individual, proper and regular skin care (what your doctor may call skin hygiene) may be all that is needed at this point  Many people do, however, need specific acne medications to help their skin look and feel its best  Your doctor will tell you if you are one of these people  If so, you may be advised to use an over-the-counter or prescription medication that is applied to the skin (a topical medication) or if the addition of an oral medication (a medication taken by [employer]) is needed  The good news is that the medications work well when used properly! Some specific factors that may influence the choice of acne therapy include:     Severity  The number and type of skin lesions (papules or comedones) and the degree of inflammation (mild, moderate or severe)   Scarring  Scarring is most common when acne is severe, but it can happen even in children with mild acne   Impact  If a child is experiencing emotional complications because of the acne or is experiencing negative comments from other children   Cost of the acne medications  An acne expert can help to keep out of pocket costs to a minimum by utilizing the correct medications and the least expensive options   The patient's skin type (oily versus dry or combination skin, for example)     Potential side effects of the medication   The ease or overall complexity of the treatment plan or medication  WHAT ACNE TREATMENTS ARE AVAILABLE? Medications for acne try to stop the formation of new pimples by reducing or removing the oil, bacteria, and other things (like dead skin cells) that clog the pores  They can also decrease the inflammation or irritation response of the skin to bacteria  It may take from 6 to 8 weeks (about 2 months!) before you see any improvement and know if the medication is effective  It takes the layers of skin this long to regenerate  Remember, these medications do not cure the condition--the acne improves because of the medication  Therefore, treatment must be continued in order to prevent the return of acne lesions  There are many types of acne treatments  Some are applied to the skin (topical medications) and some are taken by mouth (oral medications)  In most cases of mild acne, the doctor will start with a topical medication  There are many different topical medications that are helpful for acne  If acne is more severe and it does not respond adequately to a topical medication, or if it covers large body surface areas such as the back and/or chest, oral antibiotics such as Doxycycline or Minocycline and/or oral hormone therapy such as Oral Contraceptive Pills or Spironolactone may be prescribed  In the most severe cases, isotretinoin (Accutane) may be used  In general, it is usually best to start with acne medications that are least likely to cause side effects but are at the same time capable of addressing the specific causes for the acne  Some patients have a good result with just one medication, but many will need to use a combination of treatments: two or more different topical agents or an oral medication plus a topical medication       Another treatment used for acne may include corticosteroid injections, which are used to help relieve pain, decrease the size, and encourage the healing of large, inflamed acne nodules  Also, dermatologists sometimes perform acne surgery, using a fine needle, a pointed blade, or an instrument known as a comedone extractor to mechanically clean out clogged pores  One must always weigh the risk for inducing a scar with the potential benefits of any procedure  Prior treatment with topical retinoids can loosen whiteheads and blackheads and make it easier to physically remove such lesions  Heat-based devices, and light and laser therapy are being studied to see whether there is any role for such treatments in mild to moderate acne  At this time, there is not enough evidence to make general recommendations about their use  TOPICAL ACNE MEDICATIONS    WHAT KIND OF TOPICALS ARE THERE?  Benzoyl peroxide (BP) helps to fight inflammation and is anti-microbial (kills bacteria, viruses, and other microorganisms) and is believed to help prevent resistance of bacteria to topical antibiotics  A benzoyl peroxide wash may be recommended for use on large areas such as the chest and/or back  Mild irritation and dryness are common when first using benzoyl peroxide-containing products  Be careful because benzoyl peroxide can bleach towels and clothing!  Retinoids (such as adapalene, tretinoin, or tazarotene) unplug the oil glands by helping peel away the layers of skin and other things plugging the opening of the glands  Mild irritation and dryness are common when first using these products  Facial waxing and other skin procedures can lead to excessive irritation and should be avoided during retinoid therapy   Antibiotics fight bacteria and help decrease inflammation  Topical antibiotics commonly used in acne include clindamycin, erythromycin, and combination agents (such as clindamycin/benzoyl peroxide or erythromycin/benzoyl peroxide)  Mild irritation and dryness are common when first using these products   Typically, topical antibiotics should not be used alone as treatment for acne   Other topical agents include salicylic acid, azelaic acid, dapsone, and sulfacetamide  Mild irritation and dryness can also occur when first using these products  USING YOUR TOPICAL TREATMENTS LIKE A PRO   Apply topical medications only to clean, dry skin  Topical medications may lead to significant dryness of the affected areas  To minimize this, wait 15-20 minutes after washing before applying your topical medication   These medications work deep in the skin to prevent new breakouts  Spot treatment of individual pimples does not do much  When applying topical medications to the face, use the 5-dot method  Start by placing a small pea-sized amount of the medication on your finger  Then, place dots in each of five locations of your face: Mid-forehead, each cheek, nose, and chin  Next, rub the medication into the entire area of skin - not just on individual pimples! Try to avoid the delicate skin around your eyes and corners of your mouth   The medications are not magic! They take weeks if not months to work  Be patient and use your medicine on a daily basis or as directed for six weeks before asking if your skin looks better  Try not to miss more than one or two days each week when using your medications   If you are starting a new medication, then try using it every other night or even every third night   Gradually work up to [employer] a day    This will give your skin time to adjust    The same medications often come in various forms or formulations: Creams, ointments, lotions, gels, microspheres, or foams  Use the formulation that has been recommended and don't switch to other forms unless instructed  Some forms (such as alcohol based gels) may be more drying and less tolerable for certain skin types   Sometimes individual medications are not as effective as a combination of two or more agents   The doctor may need to try several medications or combinations before finding the one that is best for that patient   Moisturizer, sunscreen, and make-up may be used in conjunction with topical acne medications  In general, acne medications are applied first so they may directly contact the skin  Ask your physician to review specific application instructions!  It is especially important to always use sunscreen when using a topical retinoid or oral antibiotic  These drugs can make your skin more sensitive to the sun  In general, sunscreen gets applied AFTER any acne medications   Don't stop using your acne medications just because your acne got better  Remember, the acne is better because of the medication, and prevention is the sanchez to treatment  ORAL ACNE MEDICATIONS    ORAL ANTIBIOTICS  Antibiotics include tetracycline-class medicines (which include the most commonly used oral antibiotics for acne, minocycline, and doxycycline), erythromycin, trimethoprim-sulfamethoxazole, and occasionally cephalexin or azithromycin  These drugs may decrease bacteria and inflammation, and they are most effective for moderate-to-severe inflammatory acne  A product containing benzoyl peroxide should be used along with these antibiotics to help decrease the possibility of microbial resistance  Always take your acne pills with lots of water! A pill stuck in your throat can cause significant burning and irritation  Drink a full glass of water to ensure the pill gets into your stomach  Avoid popping a pill right before bed, and stay upright for at least 1 hour after taking a pill  DOXYCYCLINE   This medication is usually taken ONCE or TWICE per day, as instructed by your physician  NOTE: Always take this medication with lots of water! A pill stuck in the throat can cause significant burning and irritation  Avoid popping a pill right before bed & stay upright for at least one hour after taking a pill     WARNING: Doxycycline increases your sensitivity to the sun, so practice excellent sun protection! If you notice any of the following, stop using the medication and notify your health care provider: headaches; blurred vision; dizziness; sun sensitivity; heartburn-stomach pain; irritation of the esophagus; darkening of scars, gums, or teeth (more often with minocycline); nail changes; yellowing of the eyes or skin (indicating possible liver disease); joint pains-and flu-like symptoms  Taking oral antibiotics with food may help with symptoms of upset stomach  COMMON SIDE EFFECTS: Headaches; dizziness; sun sensitivity; irritation of the throat; discoloration of scars, gums, or teeth; nail changes  MINOCYCLINE   This medication is usually taken ONCE or TWICE per day, as instructed by your physician  NOTE: Always take this medication with lots of water! A pill stuck in the throat can cause significant burning and irritation  Avoid popping a pill right before bed & stay upright for at least one hour after taking a pill  WARNING: Though less likely than doxycycline, minocycline may increase your sensitivity to the sun, so practice excellent sun protection! If you notice any of the following, stop using the medication and notify your health care provider: headaches; blurred vision; dizziness; sun sensitivity; heartburn-stomach pain; irritation of the throat; darkening of scars, gums, or teeth; nail changes; yellowing of the eyes or skin (indicating possible liver disease); joint pains-and flu-like symptoms  Taking oral antibiotics with food may help with symptoms of upset stomach  COMMON SIDE EFFECTS: Headaches; dizziness; sun sensitivity; irritation of the throat; discoloration of scars, gums, or teeth (often with minocycline); nail changes  Minocycline can rarely cause liver disease, joint pains, severe skin rashes, and flu-like symptoms   If you should notice yellowing of the eyes or skin, or any of the above, notify your doctor and stop using the medication immediately  HORMONAL THERAPY  Hormonal treatment is used only in females and usually consists of oral contraceptives (birth control pills)  Spironolactone is also sometimes used  ORAL CONTRACEPTIVE PILLS   This medication is also known as the Birth Control Pill    We use it for hormonal regulation of acne  Take this medication as directed on the medication packet  NOTE: Try to find a regular time in your day to take the pill so that you don't forget  The best time is about half an hour after a meal or snack, or at bedtime  If you do forget to take your daily pill at the regular time, take one as soon as you remember and take the next at your regular scheduled time  WARNING: Do not take this medication until discussing it with your physician if you smoke, are pregnant (or trying to become pregnant or could be pregnant), have a personal history of breast cancer, have any artificial hardware or implants, have a condition called Factor 5 Leiden deficiency, have a family history of clotting problems, regularly have migraine headaches (especially with aura or due to flashing lights), or have any vaginal bleeding other than that associated with your menstrual cycle  ORAL ISOTRETINOIN (used to be called the brand name Jeffery Cushing)  Isotretinoin, a derivative of vitamin A, is a powerful drug with several significant potential side effects  It is reserved for acne which is severe or when other medications have not worked well enough  It used to be sold under the brand name Accutane but now several versions exist       HAVING PROBLEMS WITH ANY OF YOUR TREATMENTS? You should not be able to see any of the medicines on your face  If you can see a white film on your skin after you apply the medication, there is too much medicine in that area and you need to apply a thinner coat and make sure it is spread evenly on your face        If your skin gets too dry, you can apply a light (non-comedogenic) moisturizer on top of your medicine or you may switch to using the medicine every other day instead of every day  If your skin is still too irritated, you may need to switch to a milder medication  If your skin is red and very itchy, you may be allergic to the medication and you should stop using it  COMMON POSSIBLE SIDE EFFECTS OF MEDICATIONS     Retinoids - dryness, redness, increased sun sensitivity   Benzoyl peroxide - drying, redness, bleaching of clothes, towels and sheets, allergy   Doxycycline - headaches; dizziness; irritation of the throat; nail changes; discoloration of teeth   Sun sensitivity - even if you have dark skin, this medicine can make you burn more easily  Make sure you protect yourself from the sun, either by avoiding being outside between 11 AM and 3 PM, wearing and reapplying sunscreen/sunblock, or wearing sun protective clothing   Nausea/vomiting - if you experience nausea with this medication, take it with food   Minocycline - headaches; dizziness; vision problems,  irritation of the throat; discoloration of scars, gums, or teeth  Can rarely cause liver disease, joint pains, and flu-like symptoms   If you should notice yellowing of the skin or any of the above, notify your doctor and stop using the medication   Birth Control Pills - nausea; headaches; breast tenderness; feeling bloated; mood changes   Spotting between periods may occur for the first three weeks of the medication, but this is not serious  It may last for two or three cycles  Please call us if the bleeding is heavier than a light flow or lasts for more than a few days  WHEN AND WHERE TO CALL WITH CONCERNS  We are here to help! If you experience any unusual symptoms, then stop taking or using the medication and call our office at [phone])  It is better to be safe than to be sorry!

## 2021-06-29 ENCOUNTER — TELEPHONE (OUTPATIENT)
Dept: DERMATOLOGY | Facility: CLINIC | Age: 31
End: 2021-06-29

## 2021-06-29 ENCOUNTER — TELEPHONE (OUTPATIENT)
Dept: DERMATOLOGY | Age: 31
End: 2021-06-29

## 2021-06-29 NOTE — TELEPHONE ENCOUNTER
I have left a detailed message for the patient informing her that I have submitted the prior authorization for her Tretinoin and we are waiting for a response

## 2021-06-29 NOTE — TELEPHONE ENCOUNTER
sw [facility] Rep who advised the pt is no longer covered under [employer] so her medication will not be approved, please review and advise what is the next step for the pt to receive her medications

## 2021-06-29 NOTE — TELEPHONE ENCOUNTER
Patient's tretinoin has been approved  I spoke with Agustin from the pharmacy and she is aware of the approval  She will contact patient once medication is ready for

## 2021-06-29 NOTE — TELEPHONE ENCOUNTER
----- Message from Manuel Willoughby sent at 6/29/2021  8:01 AM EDT -----  Regarding: Prescription Question  Contact: [phone]  Tretinoin cream probably needs Prior auth     ----- Message -----  From: Edouard Jean Baptiste  Sent: 6/29/2021   7:43 AM EDT  To: [facility]  Subject: Prescription Question                            Hi Dr Fabi Nino,    Taylor Spivey this email finds you well  Just following up on my prescription of the Tretinoin cream  When I picked up the other prescriptions I was told that my insurance needed more information from the MD before releasing it to me  Just following up to see of you got a message from the pharmacy about it and if you need me to do anything else      Thanks,  Richar Sullivan

## 2021-08-03 ENCOUNTER — OFFICE VISIT (OUTPATIENT)
Dept: DERMATOLOGY | Age: 31
End: 2021-08-03
Payer: COMMERCIAL

## 2021-08-03 VITALS — BODY MASS INDEX: 21.94 KG/M2 | WEIGHT: 162 LBS | TEMPERATURE: 98.3 F | HEIGHT: 72 IN

## 2021-08-03 DIAGNOSIS — L70.0 ACNE VULGARIS: Primary | ICD-10-CM

## 2021-08-03 PROCEDURE — 99213 OFFICE O/P EST LOW 20 MIN: CPT | Performed by: DERMATOLOGY

## 2021-08-03 RX ORDER — SPIRONOLACTONE 50 MG/1
50 TABLET, FILM COATED ORAL DAILY
Qty: 30 TABLET | Refills: 5 | Status: SHIPPED | OUTPATIENT
Start: 2021-08-03 | End: 2021-12-23 | Stop reason: SDUPTHER

## 2021-08-03 NOTE — PROGRESS NOTES
[facility] Follow Up Note    Patient Name: Reinier Duke  Encounter Date: 08/03/2021    Today's Chief Concerns:  Carrie Barrientos Concern #1: Acne       Current Medications:    Current Outpatient Medications:     co-enzyme Q-10 30 MG capsule, Take 30 mg by mouth 3 (three) times a day, Disp: , Rfl:     doxycycline hyclate (VIBRAMYCIN) 100 mg capsule, Take 1 capsule (100 mg total) by mouth every 12 (twelve) hours, Disp: 60 capsule, Rfl: 1    Multiple Vitamin (MULTIVITAMIN) capsule, Take 1 capsule by mouth daily, Disp: , Rfl:     Omega 3 1200 MG CAPS, Take 1 capsule by mouth, Disp: , Rfl:     spironolactone (ALDACTONE) 25 mg tablet, Take 1 tablet (25 mg total) by mouth daily, Disp: 30 tablet, Rfl: 2    tretinoin (RETIN-A) 0 025 % cream, Apply topically daily at bedtime Spread one pea-sized amount of medication over entire face about one hour before bedtime  , Disp: 45 g, Rfl: 0    CONSTITUTIONAL:   Vitals:    08/03/21 1500   Temp: 98 3 °F (36 8 °C)   TempSrc: Tympanic   Weight: 73 5 kg (162 lb)   Height: 5' 11 5" (1 816 m)         Specific Alerts:    Have you been seen by a [facility] in the last 3 years? No    Are you pregnant or planning to become pregnant? No    Are you currently or planning to be nursing or breast feeding? No    No Known Allergies    May we call your Preferred Phone number to discuss your specific medical information? YES    May we leave a detailed message that includes your specific medical information? YES    Have you traveled outside of the [facility] in the past 3 months? No    Do you currently have a pacemaker or defibrillator? No    Do you have any artificial heart valves, joints, plates, screws, rods, stents, pins, etc? No   - If Yes, were any placed within the last 2 years? Do you require any medications prior to a surgical procedure?  No      Are you taking any medications that cause you to bleed more easily ("blood thinners") No    Have you ever experienced a rapid heartbeat with epinephrine? No    Have you ever been treated with "gold" (gold sodium thiomalate) therapy? No    [facility] can help with wrinkles, "laugh lines," facial volume loss, "double chin," "love handles," age spots, and more  Are you interested in learning today about some of the skin enhancement procedures that we offer? (If Yes, please provide more detail) No    Review of Systems:  Have you recently had or currently have any of the following?     · Fever or chills: No  · Night Sweats: No  · Headaches: No  · Weight Gain: No  · Weight Loss: No  · Blurry Vision: No  · Nausea: No  · Vomiting: No  · Diarrhea: No  · Blood in Stool: No  · Abdominal Pain: No  · Itchy Skin: No  · Painful Joints: No  · Swollen Joints: No  · Muscle Pain: No  · Irregular Mole: No  · Sun Burn: No  · Dry Skin: No  · Skin Color Changes: No  · Scar or Keloid: No  · Cold Sores/Fever Blisters: No  · Bacterial Infections/MRSA: No  · Anxiety: No  · Depression: No  · Suicidal or Homicidal Thoughts: No      PSYCH: Normal mood and affect  EYES: Normal conjunctiva  ENT: Normal lips and oral mucosa  CARDIOVASCULAR: No edema  RESPIRATORY: Normal respirations  HEME/LYMPH/IMMUNO:  No regional lymphadenopathy except as noted below in ASSESSMENT AND PLAN BY DIAGNOSIS    FULL ORGAN SYSTEM SKIN EXAM (SKIN)   Hair, Scalp, Ears, Face Normal except as noted below in Assessment   Neck, Cervical Chain Nodes Normal except as noted below in Assessment   Right Arm/Hand/Fingers Normal except as noted below in Assessment   Left Arm/Hand/Fingers Normal except as noted below in Assessment   Chest/Breasts/Axillae Viewed areas Normal except as noted below in Assessment   Abdomen, Umbilicus Normal except as noted below in Assessment   Back/Spine Normal except as noted below in Assessment   Groin/Genitalia/Buttocks Viewed areas Normal except as noted below in Assessment   Right Leg, Foot, Toes Normal except as noted below in Assessment Left Leg, Foot, Toes Normal except as noted below in Assessment       1  ACNE VULGARIS ("COMMON ACNE")    Physical Exam:   Psychiatric/Mood: Normal    Anatomic Location Affected: Face    Morphological Description:  o Open/Closed Comedones:  - Few ("Mild")  o Inflammatory Papules/Pustules:  - Rare ("Almost Clear")  o Nodules:  - Rare ("Almost Clear")  o Scarring:  - No evidence ("Clear")  o Excoriations:  - No evidence ("Clear")  o Local Skin Redness/Erythema:  - Rare ("Almost Clear")  o Local Skin Dryness/Scaling:  - No evidence ("Clear")  o Local Skin Dyspigmentation:  - No evidence ("Clear")   Pertinent Positives: N/A   Pertinent Negatives: N/A    Additional History of Present Condition:  Patient reports that acne is doing better on current regimen of Doxycycline, spironolactone, and tretinoin  Assessment and Plan:   We reviewed the causes of acne, the kinds of acne, and the expected clinical course   We discussed treatment options ranging from over-the-counter products, topical retinoids, antibiotics, BP, hormonal therapies (OCPs/spironolactone), and isotretinoin (Accutane)   We reviewed specific over-the-counter interventions and medications  Recommended typical hygiene measures including water-based facial products, washing regularly with mild cleanser, and refraining from picking and popping any pimples   Recommended non-comedogenic sunscreen use daily   Expectations of therapy discussed  Side effects, risks and benefits of medications discussed   A comprehensive handout on Acne was provided   The phone number to call in case of questions or concerns (and instructions to stop medications in such a scenario) was provided     After lengthy discussion of etiology and treatment options, we decided to implement the following personalized treatment plan:    Based on a thorough discussion of this condition and the management approach to it (including a comprehensive discussion of the known risks, side effects and potential benefits of treatment), the patient (family) agrees to implement the following specific plan:    --------------------------------------------------------------------------------------  YOUR PERSONALIZED ACNE ACTION PLAN    [address]    1) SKIN HYGIENE:  In the shower, wash your face, chest and back gently with Cetaphil moisturizing cleanser or Dove Fragrance-free bar  Do not use a luffa or washcloth as these tend to be too irritating to acne-prone skin  2) ANTIMICROBIAL BENZOYL PEROXIDE:   None      3) ANTIBIOTICS:     Doxycycline Take 1 tablet with a full glass of water and food      4) AZELAIC ACID 20% : APPLY TOPICALLY DAILY     EVENING ROUTINE    1) SKIN HYGIENE:  In the shower, wash your face, chest and back gently with Cetaphil moisturizing cleanser or Dove Fragrance-free bar  Do not use a lufa or washcloth as these tend to be too irritating to acne-prone skin  2) ANTIBIOTICS:     None    3) TOPICAL RETINOID:  At 1 hour before bedtime (after washing your face and allowing the skin to completely dry), spread only a single pea-sized amount of this medication evenly over your entire face (avoiding your eyes or mouth):   None    ! Can try antibacterial masks! 4) AZELAIC ACID 20% : APPLY TOPICALLY DAILY     5) Increase Spironolactone to 50 mg one tablet daily     FOLLOW UP IN 2 MONTHS       REMEMBER:  Always take your acne pills with lots of water! A pill stuck in your throat can cause significant burning and irritation  Drink a full glass of water to ensure the pill gets into your stomach  Avoid popping a pill right before bed, and stay upright for at least 1 hour after taking a pill  ACNE:  WHAT ZIT ALL ABOUT? WHY DO I HAVE ACNE/PIMPLES? Your skin is made of layers  To keep the skin from becoming dry and cracked, the skin needs oil  The oil is made in little wells in the deeper layers in the skin    People with acne have glands that make more oil and are more easily plugged, causing the glands to swell  Hormones, bacteria and your inherited tendency to have acne all play a role  The medical term for pimples is acne or acne vulgaris (vulgaris means common)  Most people get some acne  Acne does not come from being dirty  Instead, it is an expected consequence of changes that occur during normal growth and development  Hormones, bacteria, and your family's tendency to have acne may all play a role  Whiteheads or blackheads are openings of the glands (glands are the oil factories) onto the surface of the skin  Blackheads are not caused by dirt blocking the pores; instead, they result from the oxidation reaction of oil and skin in the pores with the air (like a rust reaction)  WHAT ABOUT STRESS? Stress does not cause acne but it can make it worse  Make sure you get enough sleep and daily exercise! WHAT ABOUT FOODS/DIET? Try to eat a balanced, healthy diet  Some people feel that certain foods worsen their acne  While there aren't many studies available on this question, severe dietary changes are unlikely to help your acne and may be harmful to the health of your skin  If you find that a certain food seems to aggravate your acne, you may consider avoiding that food  Discuss this with your physician! WHAT CAUSES MY ACNE? There are four contributors to acne--the body's natural oil (sebum), clogged pores, bacteria (with the scientific name Propionibacterium acnes, or P  acnes, for short), and the body's reaction to the bacteria living in the clogged pores (which causes inflammation)  Here's what happens:     Sebum is produced in the normal oil-making glands in the deeper layers of the skin and reaches the surface through the skin's pores  An increase in certain hormones occurs around the time of puberty, and these hormones trigger the oil glands to produce increased amounts of sebum     Pores with excess oil tend to become clogged more easily   At the same time, P  acnes--one of the many types of bacteria that normally live on everyone's skin--thrives in the excess oil and causes a skin reaction (inflammation)   If a pore is clogged close to the surface, there is little inflammation  However, this results in the formation of whiteheads (closed comedones) or blackheads (open comedones) at the surface of the skin   A plug that extends to, or forms a little deeper in the pore, or one that enlarges or ruptures may cause more inflammation  The result is red bumps (papules) and pus-filled pimples (pustules)   If plugging happens in the deepest skin layer, the inflammation may be even more severe, resulting in the formation of nodules or cysts  When these types of acne heal, they may leave behind discolored areas or true scars  SKIN HYGIENE:  HOW SHOULD I [address] MY SKIN? Acne does not come from being dirty, however, washing your face is part of taking good care of your skin and will help keep your face clear  Good skin hygiene is, therefore, critical to support any acne treatment plan  Here are several specific suggestions for practicing good skin hygiene and keeping your skin looking its best:     You should wash acne-prone skin TWICE A DAY: Once in the morning and once in the evening  This does include any showers you take that day, so do not overdo it!  Do not scrub the skin with a washcloth or loofah as these can irritate and inflame your acne  Acne does not come from dirt, so it is not necessary to scrub the skin clean  In fact, scrubbing may lead to dryness and irritation that makes the acne even worse and harder for patients to tolerate acne medications   Use a gentle facial moisturizing cleanser (Cetaphil Moisturizing Cleanser or Dove Fragrance-Free bar)  Avoid using soaps like [address] [facility], or soft/liquid soaps as these products will dry your skin     Do not use any over-the-counter acne washes without your doctor's specific instruction to do so  These products often contain salicylic acid or benzoyl peroxide  These ingredients can be helpful in clearing oil from the skin and reducing bacteria, but they may also be drying and can add to irritation   Do not use exfoliating products with microbeads or brushes as these can cause irritation to the skin   Facials and other treatments to remove, squeeze, or clean out pores are not recommended  Manipulating the skin in this way can make acne worse and can lead to severe infections and/or scarring  It also increases the likelihood that the skin will not be able to tolerate acne medications   Try not to pop pimples or pick at your acne as this can delay healing and may result in scarring or skin color changes (dark spots) that are often more noticeable than the acne itself  Picking/popping acne can also cause a serious skin infection   Wash or change your pillow case once to twice a week, especially if you use products in your hair   Wash the skin as soon as possible after playing sports or other activities that cause a lot of sweating  Also, pay attention to how your sports equipment (shoulder pads, helmet strap, etc ) might be making your acne worse   When you use makeup, moisturizer, or sunscreen make sure that these products are labeled non-comedogenic, or won't clog pores, or won't cause acne         SHOULD I TREAT MY ACNE? There are a number of other skin conditions that can look like acne  If there is any question about the diagnosis, then the person should be evaluated by a board certified pediatric and adolescent dermatologist   A physician should examine any child with acne who is between the ages of 3and 9years of age, as acne in this mid-childhood age group is not normal and may signal an underlying problem     If a preadolescent (9to 6years of age) or adolescent (15 to 25 years of age) has mild acne and the condition is not bothersome to the individual, proper and regular skin care (what your doctor may call skin hygiene) may be all that is needed at this point  Many people do, however, need specific acne medications to help their skin look and feel its best  Your doctor will tell you if you are one of these people  If so, you may be advised to use an over-the-counter or prescription medication that is applied to the skin (a topical medication) or if the addition of an oral medication (a medication taken by [employer]) is needed  The good news is that the medications work well when used properly! Some specific factors that may influence the choice of acne therapy include:     Severity  The number and type of skin lesions (papules or comedones) and the degree of inflammation (mild, moderate or severe)   Scarring  Scarring is most common when acne is severe, but it can happen even in children with mild acne   Impact  If a child is experiencing emotional complications because of the acne or is experiencing negative comments from other children   Cost of the acne medications  An acne expert can help to keep out of pocket costs to a minimum by utilizing the correct medications and the least expensive options   The patient's skin type (oily versus dry or combination skin, for example)   Potential side effects of the medication   The ease or overall complexity of the treatment plan or medication  WHAT ACNE TREATMENTS ARE AVAILABLE? Medications for acne try to stop the formation of new pimples by reducing or removing the oil, bacteria, and other things (like dead skin cells) that clog the pores  They can also decrease the inflammation or irritation response of the skin to bacteria  It may take from 6 to 8 weeks (about 2 months!) before you see any improvement and know if the medication is effective  It takes the layers of skin this long to regenerate   Remember, these medications do not cure the condition--the acne improves because of the medication  Therefore, treatment must be continued in order to prevent the return of acne lesions  There are many types of acne treatments  Some are applied to the skin (topical medications) and some are taken by mouth (oral medications)  In most cases of mild acne, the doctor will start with a topical medication  There are many different topical medications that are helpful for acne  If acne is more severe and it does not respond adequately to a topical medication, or if it covers large body surface areas such as the back and/or chest, oral antibiotics such as Doxycycline or Minocycline and/or oral hormone therapy such as Oral Contraceptive Pills or Spironolactone may be prescribed  In the most severe cases, isotretinoin (Accutane) may be used  In general, it is usually best to start with acne medications that are least likely to cause side effects but are at the same time capable of addressing the specific causes for the acne  Some patients have a good result with just one medication, but many will need to use a combination of treatments: two or more different topical agents or an oral medication plus a topical medication  Another treatment used for acne may include corticosteroid injections, which are used to help relieve pain, decrease the size, and encourage the healing of large, inflamed acne nodules  Also, dermatologists sometimes perform acne surgery, using a fine needle, a pointed blade, or an instrument known as a comedone extractor to mechanically clean out clogged pores  One must always weigh the risk for inducing a scar with the potential benefits of any procedure  Prior treatment with topical retinoids can loosen whiteheads and blackheads and make it easier to physically remove such lesions       Heat-based devices, and light and laser therapy are being studied to see whether there is any role for such treatments in mild to moderate acne  At this time, there is not enough evidence to make general recommendations about their use  TOPICAL ACNE MEDICATIONS    WHAT KIND OF TOPICALS ARE THERE?  Benzoyl peroxide (BP) helps to fight inflammation and is anti-microbial (kills bacteria, viruses, and other microorganisms) and is believed to help prevent resistance of bacteria to topical antibiotics  A benzoyl peroxide wash may be recommended for use on large areas such as the chest and/or back  Mild irritation and dryness are common when first using benzoyl peroxide-containing products  Be careful because benzoyl peroxide can bleach towels and clothing!  Retinoids (such as adapalene, tretinoin, or tazarotene) unplug the oil glands by helping peel away the layers of skin and other things plugging the opening of the glands  Mild irritation and dryness are common when first using these products  Facial waxing and other skin procedures can lead to excessive irritation and should be avoided during retinoid therapy   Antibiotics fight bacteria and help decrease inflammation  Topical antibiotics commonly used in acne include clindamycin, erythromycin, and combination agents (such as clindamycin/benzoyl peroxide or erythromycin/benzoyl peroxide)  Mild irritation and dryness are common when first using these products  Typically, topical antibiotics should not be used alone as treatment for acne   Other topical agents include salicylic acid, azelaic acid, dapsone, and sulfacetamide  Mild irritation and dryness can also occur when first using these products  USING YOUR TOPICAL TREATMENTS LIKE A PRO   Apply topical medications only to clean, dry skin  Topical medications may lead to significant dryness of the affected areas  To minimize this, wait 15-20 minutes after washing before applying your topical medication   These medications work deep in the skin to prevent new breakouts   Spot treatment of individual pimples does not do much  When applying topical medications to the face, use the 5-dot method  Start by placing a small pea-sized amount of the medication on your finger  Then, place dots in each of five locations of your face: Mid-forehead, each cheek, nose, and chin  Next, rub the medication into the entire area of skin - not just on individual pimples! Try to avoid the delicate skin around your eyes and corners of your mouth   The medications are not magic! They take weeks if not months to work  Be patient and use your medicine on a daily basis or as directed for six weeks before asking if your skin looks better  Try not to miss more than one or two days each week when using your medications   If you are starting a new medication, then try using it every other night or even every third night   Gradually work up to [employer] a day    This will give your skin time to adjust    The same medications often come in various forms or formulations: Creams, ointments, lotions, gels, microspheres, or foams  Use the formulation that has been recommended and don't switch to other forms unless instructed  Some forms (such as alcohol based gels) may be more drying and less tolerable for certain skin types   Sometimes individual medications are not as effective as a combination of two or more agents  The doctor may need to try several medications or combinations before finding the one that is best for that patient   Moisturizer, sunscreen, and make-up may be used in conjunction with topical acne medications  In general, acne medications are applied first so they may directly contact the skin  Ask your physician to review specific application instructions!  It is especially important to always use sunscreen when using a topical retinoid or oral antibiotic  These drugs can make your skin more sensitive to the sun  In general, sunscreen gets applied AFTER any acne medications     Don't stop using your acne medications just because your acne got better  Remember, the acne is better because of the medication, and prevention is the sanchez to treatment  ORAL ACNE MEDICATIONS    ORAL ANTIBIOTICS  Antibiotics include tetracycline-class medicines (which include the most commonly used oral antibiotics for acne, minocycline, and doxycycline), erythromycin, trimethoprim-sulfamethoxazole, and occasionally cephalexin or azithromycin  These drugs may decrease bacteria and inflammation, and they are most effective for moderate-to-severe inflammatory acne  A product containing benzoyl peroxide should be used along with these antibiotics to help decrease the possibility of microbial resistance  Always take your acne pills with lots of water! A pill stuck in your throat can cause significant burning and irritation  Drink a full glass of water to ensure the pill gets into your stomach  Avoid popping a pill right before bed, and stay upright for at least 1 hour after taking a pill  DOXYCYCLINE   This medication is usually taken ONCE or TWICE per day, as instructed by your physician  NOTE: Always take this medication with lots of water! A pill stuck in the throat can cause significant burning and irritation  Avoid popping a pill right before bed & stay upright for at least one hour after taking a pill  WARNING: Doxycycline increases your sensitivity to the sun, so practice excellent sun protection! If you notice any of the following, stop using the medication and notify your health care provider: headaches; blurred vision; dizziness; sun sensitivity; heartburn-stomach pain; irritation of the esophagus; darkening of scars, gums, or teeth (more often with minocycline); nail changes; yellowing of the eyes or skin (indicating possible liver disease); joint pains-and flu-like symptoms  Taking oral antibiotics with food may help with symptoms of upset stomach     COMMON SIDE EFFECTS: Headaches; dizziness; sun sensitivity; irritation of the throat; discoloration of scars, gums, or teeth; nail changes  MINOCYCLINE   This medication is usually taken ONCE or TWICE per day, as instructed by your physician  NOTE: Always take this medication with lots of water! A pill stuck in the throat can cause significant burning and irritation  Avoid popping a pill right before bed & stay upright for at least one hour after taking a pill  WARNING: Though less likely than doxycycline, minocycline may increase your sensitivity to the sun, so practice excellent sun protection! If you notice any of the following, stop using the medication and notify your health care provider: headaches; blurred vision; dizziness; sun sensitivity; heartburn-stomach pain; irritation of the throat; darkening of scars, gums, or teeth; nail changes; yellowing of the eyes or skin (indicating possible liver disease); joint pains-and flu-like symptoms  Taking oral antibiotics with food may help with symptoms of upset stomach  COMMON SIDE EFFECTS: Headaches; dizziness; sun sensitivity; irritation of the throat; discoloration of scars, gums, or teeth (often with minocycline); nail changes  Minocycline can rarely cause liver disease, joint pains, severe skin rashes, and flu-like symptoms  If you should notice yellowing of the eyes or skin, or any of the above, notify your doctor and stop using the medication immediately  HORMONAL THERAPY  Hormonal treatment is used only in females and usually consists of oral contraceptives (birth control pills)  Spironolactone is also sometimes used  ORAL CONTRACEPTIVE PILLS   This medication is also known as the Birth Control Pill    We use it for hormonal regulation of acne  Take this medication as directed on the medication packet  NOTE: Try to find a regular time in your day to take the pill so that you don't forget  The best time is about half an hour after a meal or snack, or at bedtime   If you do forget to take your daily pill at the regular time, take one as soon as you remember and take the next at your regular scheduled time  WARNING: Do not take this medication until discussing it with your physician if you smoke, are pregnant (or trying to become pregnant or could be pregnant), have a personal history of breast cancer, have any artificial hardware or implants, have a condition called Factor 5 Leiden deficiency, have a family history of clotting problems, regularly have migraine headaches (especially with aura or due to flashing lights), or have any vaginal bleeding other than that associated with your menstrual cycle  ORAL ISOTRETINOIN (used to be called the brand name [place])  Isotretinoin, a derivative of vitamin A, is a powerful drug with several significant potential side effects  It is reserved for acne which is severe or when other medications have not worked well enough  It used to be sold under the brand name Accutane but now several versions exist       HAVING PROBLEMS WITH ANY OF YOUR TREATMENTS? You should not be able to see any of the medicines on your face  If you can see a white film on your skin after you apply the medication, there is too much medicine in that area and you need to apply a thinner coat and make sure it is spread evenly on your face  If your skin gets too dry, you can apply a light (non-comedogenic) moisturizer on top of your medicine or you may switch to using the medicine every other day instead of every day  If your skin is still too irritated, you may need to switch to a milder medication  If your skin is red and very itchy, you may be allergic to the medication and you should stop using it  COMMON POSSIBLE SIDE EFFECTS OF MEDICATIONS     Retinoids - dryness, redness, increased sun sensitivity   Benzoyl peroxide - drying, redness, bleaching of clothes, towels and sheets, allergy     Doxycycline - headaches; dizziness; irritation of the throat; nail changes; discoloration of teeth   Sun sensitivity - even if you have dark skin, this medicine can make you burn more easily  Make sure you protect yourself from the sun, either by avoiding being outside between 11 AM and 3 PM, wearing and reapplying sunscreen/sunblock, or wearing sun protective clothing   Nausea/vomiting - if you experience nausea with this medication, take it with food   Minocycline - headaches; dizziness; vision problems,  irritation of the throat; discoloration of scars, gums, or teeth  Can rarely cause liver disease, joint pains, and flu-like symptoms   If you should notice yellowing of the skin or any of the above, notify your doctor and stop using the medication   Birth Control Pills - nausea; headaches; breast tenderness; feeling bloated; mood changes   Spotting between periods may occur for the first three weeks of the medication, but this is not serious  It may last for two or three cycles  Please call us if the bleeding is heavier than a light flow or lasts for more than a few days  WHEN AND WHERE TO CALL WITH CONCERNS  We are here to help! If you experience any unusual symptoms, then stop taking or using the medication and call our office at [phone]  It is better to be safe than to be sorry!     Scribe Attestation    I,:  Kristina Ramirez am acting as a scribe while in the presence of the attending physician :       I,:  Ty Carroll MD personally performed the services described in this documentation    as scribed in my presence :

## 2021-08-03 NOTE — PATIENT INSTRUCTIONS
1  ACNE VULGARIS ("COMMON ACNE")    --------------------------------------------------------------------------------------  YOUR PERSONALIZED ACNE ACTION PLAN    MORNING ROUTINE    1) SKIN HYGIENE:  In the shower, wash your face, chest and back gently with Cetaphil moisturizing cleanser or Dove Fragrance-free bar  Do not use a luffa or washcloth as these tend to be too irritating to acne-prone skin  2) ANTIMICROBIAL BENZOYL PEROXIDE:   None      3) ANTIBIOTICS:     Doxycycline Take 1 tablet with a full glass of water and food      4) AZELAIC ACID 20% : APPLY TOPICALLY DAILY     EVENING ROUTINE    1) SKIN HYGIENE:  In the shower, wash your face, chest and back gently with Cetaphil moisturizing cleanser or Dove Fragrance-free bar  Do not use a lufa or washcloth as these tend to be too irritating to acne-prone skin  2) ANTIBIOTICS:     None    3) TOPICAL RETINOID:  At 1 hour before bedtime (after washing your face and allowing the skin to completely dry), spread only a single pea-sized amount of this medication evenly over your entire face (avoiding your eyes or mouth):   None    ! Can try antibacterial masks! 4) AZELAIC ACID 20% : APPLY TOPICALLY DAILY     5) Increase Spironolactone to 50 mg one tablet daily     FOLLOW UP IN 2 MONTHS       REMEMBER:  Always take your acne pills with lots of water! A pill stuck in your throat can cause significant burning and irritation  Drink a full glass of water to ensure the pill gets into your stomach  Avoid popping a pill right before bed, and stay upright for at least 1 hour after taking a pill  ACNE:  WHAT ZIT ALL ABOUT? WHY DO I HAVE ACNE/PIMPLES? Your skin is made of layers  To keep the skin from becoming dry and cracked, the skin needs oil  The oil is made in little wells in the deeper layers in the skin  People with acne have glands that make more oil and are more easily plugged, causing the glands to swell   Hormones, bacteria and your inherited tendency to have acne all play a role  The medical term for pimples is acne or acne vulgaris (vulgaris means common)  Most people get some acne  Acne does not come from being dirty  Instead, it is an expected consequence of changes that occur during normal growth and development  Hormones, bacteria, and your family's tendency to have acne may all play a role  Whiteheads or blackheads are openings of the glands (glands are the oil factories) onto the surface of the skin  Blackheads are not caused by dirt blocking the pores; instead, they result from the oxidation reaction of oil and skin in the pores with the air (like a rust reaction)  WHAT ABOUT STRESS? Stress does not cause acne but it can make it worse  Make sure you get enough sleep and daily exercise! WHAT ABOUT FOODS/DIET? Try to eat a balanced, healthy diet  Some people feel that certain foods worsen their acne  While there aren't many studies available on this question, severe dietary changes are unlikely to help your acne and may be harmful to the health of your skin  If you find that a certain food seems to aggravate your acne, you may consider avoiding that food  Discuss this with your physician! WHAT CAUSES MY ACNE? There are four contributors to acne--the body's natural oil (sebum), clogged pores, bacteria (with the scientific name Propionibacterium acnes, or P  acnes, for short), and the body's reaction to the bacteria living in the clogged pores (which causes inflammation)  Here's what happens:     Sebum is produced in the normal oil-making glands in the deeper layers of the skin and reaches the surface through the skin's pores  An increase in certain hormones occurs around the time of puberty, and these hormones trigger the oil glands to produce increased amounts of sebum   Pores with excess oil tend to become clogged more easily     At the same time, P  acnes--one of the many types of bacteria that normally live on everyone's skin--thrives in the excess oil and causes a skin reaction (inflammation)   If a pore is clogged close to the surface, there is little inflammation  However, this results in the formation of whiteheads (closed comedones) or blackheads (open comedones) at the surface of the skin   A plug that extends to, or forms a little deeper in the pore, or one that enlarges or ruptures may cause more inflammation  The result is red bumps (papules) and pus-filled pimples (pustules)   If plugging happens in the deepest skin layer, the inflammation may be even more severe, resulting in the formation of nodules or cysts  When these types of acne heal, they may leave behind discolored areas or true scars  SKIN HYGIENE:  HOW SHOULD I [address] MY SKIN? Acne does not come from being dirty, however, washing your face is part of taking good care of your skin and will help keep your face clear  Good skin hygiene is, therefore, critical to support any acne treatment plan  Here are several specific suggestions for practicing good skin hygiene and keeping your skin looking its best:     You should wash acne-prone skin TWICE A DAY: Once in the morning and once in the evening  This does include any showers you take that day, so do not overdo it!  Do not scrub the skin with a washcloth or loofah as these can irritate and inflame your acne  Acne does not come from dirt, so it is not necessary to scrub the skin clean  In fact, scrubbing may lead to dryness and irritation that makes the acne even worse and harder for patients to tolerate acne medications   Use a gentle facial moisturizing cleanser (Cetaphil Moisturizing Cleanser or Dove Fragrance-Free bar)  Avoid using soaps like [address] [facility], or soft/liquid soaps as these products will dry your skin   Do not use any over-the-counter acne washes without your doctor's specific instruction to do so    These products often contain salicylic acid or benzoyl peroxide  These ingredients can be helpful in clearing oil from the skin and reducing bacteria, but they may also be drying and can add to irritation   Do not use exfoliating products with microbeads or brushes as these can cause irritation to the skin   Facials and other treatments to remove, squeeze, or clean out pores are not recommended  Manipulating the skin in this way can make acne worse and can lead to severe infections and/or scarring  It also increases the likelihood that the skin will not be able to tolerate acne medications   Try not to pop pimples or pick at your acne as this can delay healing and may result in scarring or skin color changes (dark spots) that are often more noticeable than the acne itself  Picking/popping acne can also cause a serious skin infection   Wash or change your pillow case once to twice a week, especially if you use products in your hair   Wash the skin as soon as possible after playing sports or other activities that cause a lot of sweating  Also, pay attention to how your sports equipment (shoulder pads, helmet strap, etc ) might be making your acne worse   When you use makeup, moisturizer, or sunscreen make sure that these products are labeled non-comedogenic, or won't clog pores, or won't cause acne         SHOULD I TREAT MY ACNE? There are a number of other skin conditions that can look like acne  If there is any question about the diagnosis, then the person should be evaluated by a board certified pediatric and adolescent dermatologist   A physician should examine any child with acne who is between the ages of 3and 9years of age, as acne in this mid-childhood age group is not normal and may signal an underlying problem     If a preadolescent (9to 6years of age) or adolescent (15to 25years of age) has mild acne and the condition is not bothersome to the individual, proper and regular skin care (what your doctor may call skin hygiene) may be all that is needed at this point  Many people do, however, need specific acne medications to help their skin look and feel its best  Your doctor will tell you if you are one of these people  If so, you may be advised to use an over-the-counter or prescription medication that is applied to the skin (a topical medication) or if the addition of an oral medication (a medication taken by [employer]) is needed  The good news is that the medications work well when used properly! Some specific factors that may influence the choice of acne therapy include:     Severity  The number and type of skin lesions (papules or comedones) and the degree of inflammation (mild, moderate or severe)   Scarring  Scarring is most common when acne is severe, but it can happen even in children with mild acne   Impact  If a child is experiencing emotional complications because of the acne or is experiencing negative comments from other children   Cost of the acne medications  An acne expert can help to keep out of pocket costs to a minimum by utilizing the correct medications and the least expensive options   The patient's skin type (oily versus dry or combination skin, for example)   Potential side effects of the medication   The ease or overall complexity of the treatment plan or medication  WHAT ACNE TREATMENTS ARE AVAILABLE? Medications for acne try to stop the formation of new pimples by reducing or removing the oil, bacteria, and other things (like dead skin cells) that clog the pores  They can also decrease the inflammation or irritation response of the skin to bacteria  It may take from 6 to 8 weeks (about 2 months!) before you see any improvement and know if the medication is effective  It takes the layers of skin this long to regenerate  Remember, these medications do not cure the condition--the acne improves because of the medication   Therefore, treatment must be continued in order to prevent the return of acne lesions  There are many types of acne treatments  Some are applied to the skin (topical medications) and some are taken by mouth (oral medications)  In most cases of mild acne, the doctor will start with a topical medication  There are many different topical medications that are helpful for acne  If acne is more severe and it does not respond adequately to a topical medication, or if it covers large body surface areas such as the back and/or chest, oral antibiotics such as Doxycycline or Minocycline and/or oral hormone therapy such as Oral Contraceptive Pills or Spironolactone may be prescribed  In the most severe cases, isotretinoin (Accutane) may be used  In general, it is usually best to start with acne medications that are least likely to cause side effects but are at the same time capable of addressing the specific causes for the acne  Some patients have a good result with just one medication, but many will need to use a combination of treatments: two or more different topical agents or an oral medication plus a topical medication  Another treatment used for acne may include corticosteroid injections, which are used to help relieve pain, decrease the size, and encourage the healing of large, inflamed acne nodules  Also, dermatologists sometimes perform acne surgery, using a fine needle, a pointed blade, or an instrument known as a comedone extractor to mechanically clean out clogged pores  One must always weigh the risk for inducing a scar with the potential benefits of any procedure  Prior treatment with topical retinoids can loosen whiteheads and blackheads and make it easier to physically remove such lesions  Heat-based devices, and light and laser therapy are being studied to see whether there is any role for such treatments in mild to moderate acne   At this time, there is not enough evidence to make general recommendations about their use  TOPICAL ACNE MEDICATIONS    WHAT KIND OF TOPICALS ARE THERE?  Benzoyl peroxide (BP) helps to fight inflammation and is anti-microbial (kills bacteria, viruses, and other microorganisms) and is believed to help prevent resistance of bacteria to topical antibiotics  A benzoyl peroxide wash may be recommended for use on large areas such as the chest and/or back  Mild irritation and dryness are common when first using benzoyl peroxide-containing products  Be careful because benzoyl peroxide can bleach towels and clothing!  Retinoids (such as adapalene, tretinoin, or tazarotene) unplug the oil glands by helping peel away the layers of skin and other things plugging the opening of the glands  Mild irritation and dryness are common when first using these products  Facial waxing and other skin procedures can lead to excessive irritation and should be avoided during retinoid therapy   Antibiotics fight bacteria and help decrease inflammation  Topical antibiotics commonly used in acne include clindamycin, erythromycin, and combination agents (such as clindamycin/benzoyl peroxide or erythromycin/benzoyl peroxide)  Mild irritation and dryness are common when first using these products  Typically, topical antibiotics should not be used alone as treatment for acne   Other topical agents include salicylic acid, azelaic acid, dapsone, and sulfacetamide  Mild irritation and dryness can also occur when first using these products  USING YOUR TOPICAL TREATMENTS LIKE A PRO   Apply topical medications only to clean, dry skin  Topical medications may lead to significant dryness of the affected areas  To minimize this, wait 15-20 minutes after washing before applying your topical medication   These medications work deep in the skin to prevent new breakouts  Spot treatment of individual pimples does not do much  When applying topical medications to the face, use the 5-dot method   Start by placing a small pea-sized amount of the medication on your finger  Then, place dots in each of five locations of your face: Mid-forehead, each cheek, nose, and chin  Next, rub the medication into the entire area of skin - not just on individual pimples! Try to avoid the delicate skin around your eyes and corners of your mouth   The medications are not magic! They take weeks if not months to work  Be patient and use your medicine on a daily basis or as directed for six weeks before asking if your skin looks better  Try not to miss more than one or two days each week when using your medications   If you are starting a new medication, then try using it every other night or even every third night   Gradually work up to [employer] a day    This will give your skin time to adjust    The same medications often come in various forms or formulations: Creams, ointments, lotions, gels, microspheres, or foams  Use the formulation that has been recommended and don't switch to other forms unless instructed  Some forms (such as alcohol based gels) may be more drying and less tolerable for certain skin types   Sometimes individual medications are not as effective as a combination of two or more agents  The doctor may need to try several medications or combinations before finding the one that is best for that patient   Moisturizer, sunscreen, and make-up may be used in conjunction with topical acne medications  In general, acne medications are applied first so they may directly contact the skin  Ask your physician to review specific application instructions!  It is especially important to always use sunscreen when using a topical retinoid or oral antibiotic  These drugs can make your skin more sensitive to the sun  In general, sunscreen gets applied AFTER any acne medications   Don't stop using your acne medications just because your acne got better   Remember, the acne is better because of the medication, and prevention is the sanchez to treatment  ORAL ACNE MEDICATIONS    ORAL ANTIBIOTICS  Antibiotics include tetracycline-class medicines (which include the most commonly used oral antibiotics for acne, minocycline, and doxycycline), erythromycin, trimethoprim-sulfamethoxazole, and occasionally cephalexin or azithromycin  These drugs may decrease bacteria and inflammation, and they are most effective for moderate-to-severe inflammatory acne  A product containing benzoyl peroxide should be used along with these antibiotics to help decrease the possibility of microbial resistance  Always take your acne pills with lots of water! A pill stuck in your throat can cause significant burning and irritation  Drink a full glass of water to ensure the pill gets into your stomach  Avoid popping a pill right before bed, and stay upright for at least 1 hour after taking a pill  DOXYCYCLINE   This medication is usually taken ONCE or TWICE per day, as instructed by your physician  NOTE: Always take this medication with lots of water! A pill stuck in the throat can cause significant burning and irritation  Avoid popping a pill right before bed & stay upright for at least one hour after taking a pill  WARNING: Doxycycline increases your sensitivity to the sun, so practice excellent sun protection! If you notice any of the following, stop using the medication and notify your health care provider: headaches; blurred vision; dizziness; sun sensitivity; heartburn-stomach pain; irritation of the esophagus; darkening of scars, gums, or teeth (more often with minocycline); nail changes; yellowing of the eyes or skin (indicating possible liver disease); joint pains-and flu-like symptoms  Taking oral antibiotics with food may help with symptoms of upset stomach  COMMON SIDE EFFECTS: Headaches; dizziness; sun sensitivity; irritation of the throat; discoloration of scars, gums, or teeth; nail changes       MINOCYCLINE   This medication is usually taken ONCE or TWICE per day, as instructed by your physician  NOTE: Always take this medication with lots of water! A pill stuck in the throat can cause significant burning and irritation  Avoid popping a pill right before bed & stay upright for at least one hour after taking a pill  WARNING: Though less likely than doxycycline, minocycline may increase your sensitivity to the sun, so practice excellent sun protection! If you notice any of the following, stop using the medication and notify your health care provider: headaches; blurred vision; dizziness; sun sensitivity; heartburn-stomach pain; irritation of the throat; darkening of scars, gums, or teeth; nail changes; yellowing of the eyes or skin (indicating possible liver disease); joint pains-and flu-like symptoms  Taking oral antibiotics with food may help with symptoms of upset stomach  COMMON SIDE EFFECTS: Headaches; dizziness; sun sensitivity; irritation of the throat; discoloration of scars, gums, or teeth (often with minocycline); nail changes  Minocycline can rarely cause liver disease, joint pains, severe skin rashes, and flu-like symptoms  If you should notice yellowing of the eyes or skin, or any of the above, notify your doctor and stop using the medication immediately  HORMONAL THERAPY  Hormonal treatment is used only in females and usually consists of oral contraceptives (birth control pills)  Spironolactone is also sometimes used  ORAL CONTRACEPTIVE PILLS   This medication is also known as the Birth Control Pill    We use it for hormonal regulation of acne  Take this medication as directed on the medication packet  NOTE: Try to find a regular time in your day to take the pill so that you don't forget  The best time is about half an hour after a meal or snack, or at bedtime  If you do forget to take your daily pill at the regular time, take one as soon as you remember and take the next at your regular scheduled time     WARNING: Do not take this medication until discussing it with your physician if you smoke, are pregnant (or trying to become pregnant or could be pregnant), have a personal history of breast cancer, have any artificial hardware or implants, have a condition called Factor 5 Leiden deficiency, have a family history of clotting problems, regularly have migraine headaches (especially with aura or due to flashing lights), or have any vaginal bleeding other than that associated with your menstrual cycle  ORAL ISOTRETINOIN (used to be called the brand name Mandy Apgar)  Isotretinoin, a derivative of vitamin A, is a powerful drug with several significant potential side effects  It is reserved for acne which is severe or when other medications have not worked well enough  It used to be sold under the brand name Accutane but now several versions exist       HAVING PROBLEMS WITH ANY OF YOUR TREATMENTS? You should not be able to see any of the medicines on your face  If you can see a white film on your skin after you apply the medication, there is too much medicine in that area and you need to apply a thinner coat and make sure it is spread evenly on your face  If your skin gets too dry, you can apply a light (non-comedogenic) moisturizer on top of your medicine or you may switch to using the medicine every other day instead of every day  If your skin is still too irritated, you may need to switch to a milder medication  If your skin is red and very itchy, you may be allergic to the medication and you should stop using it  COMMON POSSIBLE SIDE EFFECTS OF MEDICATIONS     Retinoids - dryness, redness, increased sun sensitivity   Benzoyl peroxide - drying, redness, bleaching of clothes, towels and sheets, allergy   Doxycycline - headaches; dizziness; irritation of the throat; nail changes; discoloration of teeth   Sun sensitivity - even if you have dark skin, this medicine can make you burn more easily    Make sure you protect yourself from the sun, either by avoiding being outside between 11 AM and 3 PM, wearing and reapplying sunscreen/sunblock, or wearing sun protective clothing   Nausea/vomiting - if you experience nausea with this medication, take it with food   Minocycline - headaches; dizziness; vision problems,  irritation of the throat; discoloration of scars, gums, or teeth  Can rarely cause liver disease, joint pains, and flu-like symptoms   If you should notice yellowing of the skin or any of the above, notify your doctor and stop using the medication   Birth Control Pills - nausea; headaches; breast tenderness; feeling bloated; mood changes   Spotting between periods may occur for the first three weeks of the medication, but this is not serious  It may last for two or three cycles  Please call us if the bleeding is heavier than a light flow or lasts for more than a few days  WHEN AND WHERE TO CALL WITH CONCERNS  We are here to help! If you experience any unusual symptoms, then stop taking or using the medication and call our office at [phone] (SKIN)  It is better to be safe than to be sorry!

## 2021-08-04 ENCOUNTER — TELEPHONE (OUTPATIENT)
Dept: DERMATOLOGY | Age: 31
End: 2021-08-04

## 2021-08-04 NOTE — TELEPHONE ENCOUNTER
The patient's Azeliac acid is not being covered by insurance  Per Dr Marc Nolasco medication can be sent into [facility] for $35 00 if she would be ok  Please advise Dr Marc Nolasco when patient calls back on her preference

## 2021-08-06 NOTE — TELEPHONE ENCOUNTER
Patient she was not able to answer phone call earlier due to being at work  Spoke with her and she  is okay with getting medication for $35 00 out of pocket

## 2021-08-18 DIAGNOSIS — L70.0 ACNE VULGARIS: ICD-10-CM

## 2021-08-18 RX ORDER — DOXYCYCLINE HYCLATE 100 MG/1
CAPSULE ORAL
Qty: 60 CAPSULE | Refills: 0 | Status: SHIPPED | OUTPATIENT
Start: 2021-08-18 | End: 2021-09-17

## 2021-08-26 ENCOUNTER — APPOINTMENT (OUTPATIENT)
Dept: LAB | Facility: CLINIC | Age: 31
End: 2021-08-26

## 2021-10-06 ENCOUNTER — TELEPHONE (OUTPATIENT)
Dept: DERMATOLOGY | Facility: CLINIC | Age: 31
End: 2021-10-06

## 2021-11-02 ENCOUNTER — ANNUAL EXAM (OUTPATIENT)
Dept: OBGYN CLINIC | Facility: CLINIC | Age: 31
End: 2021-11-02
Payer: COMMERCIAL

## 2021-11-02 VITALS — HEIGHT: 70 IN | WEIGHT: 157 LBS | BODY MASS INDEX: 22.48 KG/M2

## 2021-11-02 DIAGNOSIS — Z23 NEED FOR HPV VACCINATION: ICD-10-CM

## 2021-11-02 DIAGNOSIS — Z01.419 WOMEN'S ANNUAL ROUTINE GYNECOLOGICAL EXAMINATION: Primary | ICD-10-CM

## 2021-11-02 PROBLEM — R53.82 CHRONIC FATIGUE: Status: RESOLVED | Noted: 2019-08-14 | Resolved: 2021-11-02

## 2021-11-02 PROBLEM — L65.9 HAIR LOSS: Status: RESOLVED | Noted: 2019-08-14 | Resolved: 2021-11-02

## 2021-11-02 PROCEDURE — 99395 PREV VISIT EST AGE 18-39: CPT | Performed by: OBSTETRICS & GYNECOLOGY

## 2021-11-02 PROCEDURE — 90471 IMMUNIZATION ADMIN: CPT

## 2021-11-02 PROCEDURE — 90651 9VHPV VACCINE 2/3 DOSE IM: CPT

## 2021-12-03 ENCOUNTER — OFFICE VISIT (OUTPATIENT)
Dept: FAMILY MEDICINE CLINIC | Facility: CLINIC | Age: 31
End: 2021-12-03
Payer: COMMERCIAL

## 2021-12-03 VITALS
DIASTOLIC BLOOD PRESSURE: 60 MMHG | OXYGEN SATURATION: 98 % | WEIGHT: 157.6 LBS | HEART RATE: 73 BPM | RESPIRATION RATE: 16 BRPM | HEIGHT: 71 IN | TEMPERATURE: 97.8 F | BODY MASS INDEX: 22.06 KG/M2 | SYSTOLIC BLOOD PRESSURE: 118 MMHG

## 2021-12-03 DIAGNOSIS — Z00.00 PHYSICAL EXAM: Primary | ICD-10-CM

## 2021-12-03 PROCEDURE — 99395 PREV VISIT EST AGE 18-39: CPT | Performed by: NURSE PRACTITIONER

## 2021-12-10 DIAGNOSIS — Z20.822 EXPOSURE TO COVID-19 VIRUS: Primary | ICD-10-CM

## 2021-12-10 PROCEDURE — U0003 INFECTIOUS AGENT DETECTION BY NUCLEIC ACID (DNA OR RNA); SEVERE ACUTE RESPIRATORY SYNDROME CORONAVIRUS 2 (SARS-COV-2) (CORONAVIRUS DISEASE [COVID-19]), AMPLIFIED PROBE TECHNIQUE, MAKING USE OF HIGH THROUGHPUT TECHNOLOGIES AS DESCRIBED BY CMS-2020-01-R: HCPCS | Performed by: NURSE PRACTITIONER

## 2021-12-10 PROCEDURE — U0005 INFEC AGEN DETEC AMPLI PROBE: HCPCS | Performed by: NURSE PRACTITIONER

## 2021-12-11 LAB — SARS-COV-2 RNA RESP QL NAA+PROBE: NEGATIVE

## 2021-12-23 ENCOUNTER — OFFICE VISIT (OUTPATIENT)
Dept: DERMATOLOGY | Age: 31
End: 2021-12-23
Payer: COMMERCIAL

## 2021-12-23 VITALS — HEIGHT: 61 IN | WEIGHT: 156.6 LBS | BODY MASS INDEX: 29.57 KG/M2 | TEMPERATURE: 98.6 F

## 2021-12-23 DIAGNOSIS — L70.0 ACNE VULGARIS: ICD-10-CM

## 2021-12-23 PROCEDURE — 99213 OFFICE O/P EST LOW 20 MIN: CPT | Performed by: DERMATOLOGY

## 2021-12-23 RX ORDER — DAPSONE 50 MG/G
1 GEL TOPICAL 2 TIMES DAILY
Qty: 60 G | Refills: 2 | Status: SHIPPED | OUTPATIENT
Start: 2021-12-23 | End: 2022-02-08 | Stop reason: SDUPTHER

## 2021-12-23 RX ORDER — SPIRONOLACTONE 50 MG/1
50 TABLET, FILM COATED ORAL DAILY
Qty: 30 TABLET | Refills: 5 | Status: SHIPPED | OUTPATIENT
Start: 2021-12-23 | End: 2022-02-08 | Stop reason: SDUPTHER

## 2022-01-11 ENCOUNTER — CLINICAL SUPPORT (OUTPATIENT)
Dept: OBGYN CLINIC | Facility: CLINIC | Age: 32
End: 2022-01-11
Payer: COMMERCIAL

## 2022-01-11 VITALS — SYSTOLIC BLOOD PRESSURE: 118 MMHG | WEIGHT: 156.8 LBS | BODY MASS INDEX: 29.32 KG/M2 | DIASTOLIC BLOOD PRESSURE: 68 MMHG

## 2022-01-11 DIAGNOSIS — Z23 NEED FOR HPV VACCINE: Primary | ICD-10-CM

## 2022-01-11 PROCEDURE — 90651 9VHPV VACCINE 2/3 DOSE IM: CPT | Performed by: OBSTETRICS & GYNECOLOGY

## 2022-01-11 PROCEDURE — 90471 IMMUNIZATION ADMIN: CPT | Performed by: OBSTETRICS & GYNECOLOGY

## 2022-01-11 NOTE — PROGRESS NOTES
Patient here for second HPV vaccine  Given in right deltoid without difficulty   Return in 4 months for third injection

## 2022-02-08 DIAGNOSIS — L70.0 ACNE VULGARIS: ICD-10-CM

## 2022-02-09 RX ORDER — SPIRONOLACTONE 50 MG/1
50 TABLET, FILM COATED ORAL DAILY
Qty: 30 TABLET | Refills: 0 | Status: SHIPPED | OUTPATIENT
Start: 2022-02-09 | End: 2022-05-25

## 2022-02-09 RX ORDER — DAPSONE 50 MG/G
1 GEL TOPICAL 2 TIMES DAILY
Qty: 60 G | Refills: 0 | Status: SHIPPED | OUTPATIENT
Start: 2022-02-09 | End: 2022-05-25

## 2022-02-16 ENCOUNTER — TELEPHONE (OUTPATIENT)
Dept: DERMATOLOGY | Facility: CLINIC | Age: 32
End: 2022-02-16

## 2022-05-25 ENCOUNTER — OFFICE VISIT (OUTPATIENT)
Dept: OBGYN CLINIC | Facility: CLINIC | Age: 32
End: 2022-05-25
Payer: COMMERCIAL

## 2022-05-25 VITALS
DIASTOLIC BLOOD PRESSURE: 70 MMHG | WEIGHT: 159.8 LBS | BODY MASS INDEX: 22.37 KG/M2 | HEIGHT: 71 IN | SYSTOLIC BLOOD PRESSURE: 138 MMHG

## 2022-05-25 DIAGNOSIS — N91.2 AMENORRHEA: Primary | ICD-10-CM

## 2022-05-25 LAB — SL AMB POCT URINE HCG: POSITIVE

## 2022-05-25 PROCEDURE — 99214 OFFICE O/P EST MOD 30 MIN: CPT | Performed by: OBSTETRICS & GYNECOLOGY

## 2022-05-25 PROCEDURE — 81025 URINE PREGNANCY TEST: CPT | Performed by: OBSTETRICS & GYNECOLOGY

## 2022-05-25 NOTE — PROGRESS NOTES
Assessment/Plan:  - Viable IUP @ 7w 2d EGA  - ABDIEL 2023  - Continue PNV  - Referral to MFM  - Patient to call for concerns  - RTO 3 weeks for OB intake      Diagnoses and all orders for this visit:    Amenorrhea  -     POCT urine HCG  -     AMB US OB < 14 weeks single or first gestation level 1  -     Ambulatory Referral to Maternal Fetal Medicine; Future    Other orders  -     Prenatal Vit-Fe Fumarate-FA (PRENATAL PO); Take by mouth               Subjective:       Patient ID: Imelda Haas 1990        Imelda Haas is a 28 y o  Osmar Ape presenting to the office for pregnancy confirmation  Patient's last menstrual period was 2022  , placing her at [facility] today with ABDIEL of 2023  She is feeling well  Denies any vaginal bleeding or severe pain  OB History    Para Term  AB Living   0 0 0 0 0 0   SAB IAB Ectopic Multiple Live Births   0 0 0 0 0   Obstetric Comments   Menarche: 16         The following portions of the patient's history were reviewed and updated as appropriate: allergies, past family history, past social history and problem list     Allergies:  Patient has no known allergies  Medications:    Current Outpatient Medications:     Omega 3 1200 MG CAPS, Take 1 capsule by mouth, Disp: , Rfl:     Prenatal Vit-Fe Fumarate-FA (PRENATAL PO), Take by mouth, Disp: , Rfl:       Review of Systems:   Review of Systems       Objective:       Visit Vitals  /70 (BP Location: Left arm, Patient Position: Sitting, Cuff Size: Standard)   Ht 5' 11" (1 803 m)   Wt 72 5 kg (159 lb 12 8 oz)   LMP 2022   BMI 22 29 kg/m²   OB Status Unknown   Smoking Status Never Smoker   BSA 1 92 m²        GEN: The patient was alert and oriented x3, pleasant well-appearing female in no acute distress     CV: Regular rate  PULM: nonlabored respirations  MSK: Normal gait  Skin: warm, dry  Neuro: no focal deficits  Psych: normal affect and judgement, cooperative    Ultrasound:     Viability US     Gestational sac: present               Location: intrauterine  Yolk sac: present  Fetal pole: present               CRL: 1 04 cm = 7w1d  Cardiac activity: present               Rate: 148 bpm     Ovaries: normal appearing bilaterally  Cul de sac: absence of free fluid  Uterus: normal in appearance           Ultrasound Probe Disinfection    A transvaginal ultrasound was performed     Prior to use, disinfection was performed with High Level Disinfection Process (Trophon)    Salvatore Finley MD  05/25/22  3:29 PM

## 2022-06-07 ENCOUNTER — INITIAL PRENATAL (OUTPATIENT)
Dept: OBGYN CLINIC | Facility: CLINIC | Age: 32
End: 2022-06-07

## 2022-06-07 ENCOUNTER — APPOINTMENT (OUTPATIENT)
Dept: LAB | Facility: AMBULARY SURGERY CENTER | Age: 32
End: 2022-06-07
Attending: OBSTETRICS & GYNECOLOGY
Payer: COMMERCIAL

## 2022-06-07 VITALS — WEIGHT: 160 LBS | BODY MASS INDEX: 22.32 KG/M2

## 2022-06-07 DIAGNOSIS — Z34.01 ENCOUNTER FOR SUPERVISION OF NORMAL FIRST PREGNANCY IN FIRST TRIMESTER: Primary | ICD-10-CM

## 2022-06-07 DIAGNOSIS — Z34.01 ENCOUNTER FOR SUPERVISION OF NORMAL FIRST PREGNANCY IN FIRST TRIMESTER: ICD-10-CM

## 2022-06-07 LAB
ABO GROUP BLD: NORMAL
BASOPHILS # BLD AUTO: 0.04 THOUSANDS/ΜL (ref 0–0.1)
BASOPHILS NFR BLD AUTO: 0 % (ref 0–1)
BLD GP AB SCN SERPL QL: NEGATIVE
EOSINOPHIL # BLD AUTO: 0.08 THOUSAND/ΜL (ref 0–0.61)
EOSINOPHIL NFR BLD AUTO: 1 % (ref 0–6)
ERYTHROCYTE [DISTWIDTH] IN BLOOD BY AUTOMATED COUNT: 13.2 % (ref 11.6–15.1)
HBV SURFACE AG SER QL: NORMAL
HCT VFR BLD AUTO: 37.7 % (ref 34.8–46.1)
HCV AB SER QL: NORMAL
HGB BLD-MCNC: 12.8 G/DL (ref 11.5–15.4)
IMM GRANULOCYTES # BLD AUTO: 0.03 THOUSAND/UL (ref 0–0.2)
IMM GRANULOCYTES NFR BLD AUTO: 0 % (ref 0–2)
LYMPHOCYTES # BLD AUTO: 2.19 THOUSANDS/ΜL (ref 0.6–4.47)
LYMPHOCYTES NFR BLD AUTO: 23 % (ref 14–44)
MCH RBC QN AUTO: 29.4 PG (ref 26.8–34.3)
MCHC RBC AUTO-ENTMCNC: 34 G/DL (ref 31.4–37.4)
MCV RBC AUTO: 87 FL (ref 82–98)
MONOCYTES # BLD AUTO: 0.59 THOUSAND/ΜL (ref 0.17–1.22)
MONOCYTES NFR BLD AUTO: 6 % (ref 4–12)
NEUTROPHILS # BLD AUTO: 6.65 THOUSANDS/ΜL (ref 1.85–7.62)
NEUTS SEG NFR BLD AUTO: 70 % (ref 43–75)
NRBC BLD AUTO-RTO: 0 /100 WBCS
PLATELET # BLD AUTO: 274 THOUSANDS/UL (ref 149–390)
PMV BLD AUTO: 10.7 FL (ref 8.9–12.7)
RBC # BLD AUTO: 4.36 MILLION/UL (ref 3.81–5.12)
RH BLD: POSITIVE
RUBV IGG SERPL IA-ACNC: >175 IU/ML
SPECIMEN EXPIRATION DATE: NORMAL
WBC # BLD AUTO: 9.58 THOUSAND/UL (ref 4.31–10.16)

## 2022-06-07 PROCEDURE — 87086 URINE CULTURE/COLONY COUNT: CPT

## 2022-06-07 PROCEDURE — 36415 COLL VENOUS BLD VENIPUNCTURE: CPT

## 2022-06-07 PROCEDURE — 86803 HEPATITIS C AB TEST: CPT

## 2022-06-07 PROCEDURE — OBC: Performed by: OBSTETRICS & GYNECOLOGY

## 2022-06-07 PROCEDURE — 86787 VARICELLA-ZOSTER ANTIBODY: CPT

## 2022-06-07 PROCEDURE — 80081 OBSTETRIC PANEL INC HIV TSTG: CPT

## 2022-06-07 NOTE — PROGRESS NOTES
OB INTAKE INTERVIEW  Pt presents for OB intake  Plan:  - Prenatal labs ordered  - Referral given for MFM at confirmation scan  - Reviewed Genetic testing options   CF: Pt  Accepts will start prior auth   SMA: Pt  Mary Cosme will start prior auth  - Patient to call for concerns  - RTO 3-4 weeks for OB F/U visit and PAP/Cultures    ~Last pap:          OB History        1    Para   0    Term   0       0    AB   0    Living   0       SAB   0    IAB   0    Ectopic   0    Multiple   0    Live Births   0           Obstetric Comments   Menarche: 14             Hx of  delivery prior to 36 weeks 6 days: no      Patient's last menstrual period was 2022  Ultrasound date: 22  Estimated Date of Delivery:  23     Current Issues:  Constipation : Early on               Headaches :   No   Cramping:       no         Spotting :         no               Interview education  · [facility]  [employer]'s Pregnancy Essentials reviewed and discussed   · Baby and [address] Handout  · [facility]  [employer] MFM Handouts  · Discussed genetic testing  · Prenatal lab work: Scripts printed and given to pt  Prior Pregnancy Delivery Complications              History of  delivery or PPROM: No  History of Shoulder Dystocia: No              History of vacuum or forceps delivery: No               History of 3rd/4th degree laceration: No              History of  section: NO     Diabetes              Pregestational DM: No              hx of GDM: No              BMI >35: No              first degree relative with type 2 diabetes: No              hx of PCOS: No              current metformin use:  No              prior hx of LGA/macrosomia: No               Hypertension              Hx of chronic HTN: No              hx of gestational HTN: No               hx of preeclampsia, eclampsia, or HELLP syndrome: No               Age 28 or older:  No              Multifetal gestation: NO  Type 1 or Type 2 DM: No  Renal Disease: No  Autoimmune disease (systemic lupus erythematosus, antiphospholipid antibody syndrome): No  Nulliparity: YES  Obesity (BMI over 30): No  More than 10 year pregnancy interval: NO  Previous IUGR, low birthweight or small for gestational age: No     Immunizations:                influenza vaccine: No              Covid Vaccination: No  Discussed Tdap vaccine administration at 27-28 weeks                   Immunization History   Administered Date(s) Administered    COVID-19 MODERNA VACC 0 5 ML IM 12/29/2020, 01/26/2021    DTaP 5 1990, 1990, 01/31/1992, 04/29/1993, 07/05/1996    HPV9 11/02/2021, 01/11/2022    Hep B, adult 05/22/1992, 06/19/1992, 11/30/1992    Hib (PRP-OMP) 1990, 1991, 01/31/1992, 11/04/1992    INFLUENZA 11/18/2021    IPV 1990, 1991, 01/31/1992, 04/29/1993, 07/05/1996    Influenza Quadrivalent, 6-35 Months IM 10/31/2017    MMR 11/04/1992, 07/05/1996, 03/04/2013    Meningococcal, Unknown Serogroups 06/29/2005    Td (adult), adsorbed 08/24/2002    Tdap 12/22/2011    Tuberculin Skin Test-PPD Intradermal 06/24/2009    Varicella 07/14/1995, 08/19/2008            Dental visit with last 6 months: Yes  PHQ-2/9 score: 0  MyChart activated (not 1518 years of age)?:     The patient was oriented to our practice and all questions were answered

## 2022-06-07 NOTE — LETTER
To Whom it May Concern:   ?   Per the [employer]nec[employer] (ACOG), Spinal Muscular Atrophy (SMA) should be offered to all women who are currently pregnant  The incidence of SMA is approximately 1 in 6,000 to 1 in 10,000 live births, and the disease is reported to be the leading genetic cause of infant death  Because of the severity and relatively high carrier frequency, there has been increasing interest in carrier screening for SMA in the general prenatal population  Knowledge of this during pregnancy allows patients to consider prenatal diagnosis and pregnancy management options in the event of an affected fetus  Per the [employer] Gynecologists (ACOG) suggests that all couples who are considering having a child -- or those who are already pregnant -- should be offered testing to find out if they are CF carriers  Per the [employer] of Obstetricians and Gynecologist prenatal testing for fragile X syndrome should be offered to known carriers of the fragile X premutation or full mutation  Women with a family history of fragile X-related disorders, unexplained mental retardation or developmental delay, autism, or premature ovarian insufficiency are candidates for genetic counseling and fragile X premutation carrier screening  ? Please feel free to contact our office with any further questions  [phone]      ?    Violeta Middleton DO, [address] Sofia Riley MD, [address] [place] [address],    MD Liss Walker DO Gwenlyn Hitt, PA-C

## 2022-06-08 LAB
BACTERIA UR CULT: NORMAL
HIV 1+2 AB+HIV1 P24 AG SERPL QL IA: NORMAL
RPR SER QL: NORMAL

## 2022-06-09 LAB — VZV IGG SER IA-ACNC: NORMAL

## 2022-06-20 ENCOUNTER — ROUTINE PRENATAL (OUTPATIENT)
Dept: OBGYN CLINIC | Facility: CLINIC | Age: 32
End: 2022-06-20

## 2022-06-20 VITALS — WEIGHT: 161 LBS | DIASTOLIC BLOOD PRESSURE: 58 MMHG | SYSTOLIC BLOOD PRESSURE: 110 MMHG | BODY MASS INDEX: 22.45 KG/M2

## 2022-06-20 DIAGNOSIS — Z31.430 ENCOUNTER OF FEMALE FOR TESTING FOR GENETIC DISEASE CARRIER STATUS FOR PROCREATIVE MANAGEMENT: ICD-10-CM

## 2022-06-20 DIAGNOSIS — Z34.01 ENCOUNTER FOR SUPERVISION OF NORMAL FIRST PREGNANCY IN FIRST TRIMESTER: Primary | ICD-10-CM

## 2022-06-20 PROCEDURE — G0145 SCR C/V CYTO,THINLAYER,RESCR: HCPCS | Performed by: OBSTETRICS & GYNECOLOGY

## 2022-06-20 PROCEDURE — 87491 CHLMYD TRACH DNA AMP PROBE: CPT | Performed by: OBSTETRICS & GYNECOLOGY

## 2022-06-20 PROCEDURE — PNV: Performed by: OBSTETRICS & GYNECOLOGY

## 2022-06-20 PROCEDURE — G0476 HPV COMBO ASSAY CA SCREEN: HCPCS | Performed by: OBSTETRICS & GYNECOLOGY

## 2022-06-20 PROCEDURE — 87591 N.GONORRHOEAE DNA AMP PROB: CPT | Performed by: OBSTETRICS & GYNECOLOGY

## 2022-06-21 NOTE — PROGRESS NOTES
28 y o   female at 11w0d (Estimated Date of Delivery: 23) for PNV  Pre-Darling Vitals    Flowsheet Row Most Recent Value   Prenatal Assessment    Prenatal Vitals    Blood Pressure 110/58   Weight - Scale 73 kg (161 lb)   Urine Albumin/Glucose    Dilation/Effacement/Station    Vaginal Drainage    Edema         TWG: Not found  Doing well 1st trimester  Pap with gc collected  Scheduled for MFM appt    Taking PNV

## 2022-06-22 LAB
HPV HR 12 DNA CVX QL NAA+PROBE: NEGATIVE
HPV16 DNA CVX QL NAA+PROBE: NEGATIVE
HPV18 DNA CVX QL NAA+PROBE: NEGATIVE

## 2022-06-23 LAB
C TRACH DNA SPEC QL NAA+PROBE: NEGATIVE
LAB AP GYN PRIMARY INTERPRETATION: NORMAL
Lab: NORMAL
N GONORRHOEA DNA SPEC QL NAA+PROBE: NEGATIVE

## 2022-07-08 ENCOUNTER — APPOINTMENT (OUTPATIENT)
Dept: LAB | Facility: CLINIC | Age: 32
End: 2022-07-08
Payer: COMMERCIAL

## 2022-07-08 ENCOUNTER — ROUTINE PRENATAL (OUTPATIENT)
Dept: PERINATAL CARE | Facility: OTHER | Age: 32
End: 2022-07-08
Payer: COMMERCIAL

## 2022-07-08 VITALS
HEART RATE: 75 BPM | SYSTOLIC BLOOD PRESSURE: 120 MMHG | BODY MASS INDEX: 22.75 KG/M2 | WEIGHT: 162.48 LBS | HEIGHT: 71 IN | DIASTOLIC BLOOD PRESSURE: 80 MMHG

## 2022-07-08 DIAGNOSIS — Z33.1 PREGNANT STATE, INCIDENTAL: ICD-10-CM

## 2022-07-08 DIAGNOSIS — Z36.9 UNSPECIFIED ANTENATAL SCREENING: ICD-10-CM

## 2022-07-08 DIAGNOSIS — N91.2 AMENORRHEA: ICD-10-CM

## 2022-07-08 DIAGNOSIS — Z36.82 ENCOUNTER FOR NUCHAL TRANSLUCENCY TESTING: ICD-10-CM

## 2022-07-08 DIAGNOSIS — Z3A.13 13 WEEKS GESTATION OF PREGNANCY: ICD-10-CM

## 2022-07-08 DIAGNOSIS — O36.80X0 ENCOUNTER TO DETERMINE FETAL VIABILITY OF PREGNANCY, SINGLE OR UNSPECIFIED FETUS: Primary | ICD-10-CM

## 2022-07-08 PROCEDURE — 99241 PR OFFICE CONSULTATION NEW/ESTAB PATIENT 15 MIN: CPT | Performed by: OBSTETRICS & GYNECOLOGY

## 2022-07-08 PROCEDURE — 76813 OB US NUCHAL MEAS 1 GEST: CPT | Performed by: OBSTETRICS & GYNECOLOGY

## 2022-07-08 PROCEDURE — 76801 OB US < 14 WKS SINGLE FETUS: CPT | Performed by: OBSTETRICS & GYNECOLOGY

## 2022-07-08 PROCEDURE — 36415 COLL VENOUS BLD VENIPUNCTURE: CPT

## 2022-07-08 NOTE — PROGRESS NOTES
Patient chose to have [facility] Non-invasive Prenatal Screen  Patient given brochure and is aware [facility] will contact patients insurance and coordinate coverage  Patient made aware she will need to respond to text message or e-mail from Mirela Brennan within 2 business days or testing will be run through insurance  Patient informed text message will come from area code  "415"  Provided [address] [place] [address] Street # [phone] and web site : [email or web address]  Lab kit and  given to patient to take to [facility] outpatient lab to have drawn immediately  Copy of lab order scanned to Epic media  Maternal Fetal Medicine will have results in approximately 7-10 business days and will call patient or notify via [address]  Patient aware viewing lab result online will reveal fetal sex If ordered  Patient verbalized understanding of all instructions and no questions at this time

## 2022-07-08 NOTE — PROGRESS NOTES
CONSULT NOTE    MD [address]  [address]  [place],  [address] [facility]     Thank you for referring your Katie Mcneil for a Maternal-Fetal Medicine Consultation:  Below is my consultation  Thank you very much for requesting a consultation on this very nice patient for the indication of genetic screening  This is the patient's 1st pregnancy  Other than a history of wisdom teeth extraction, she has no other significant medical or surgical history  She has no known drug allergies  Her substance use history is unremarkable  Family history is only significant for her father with hypertension related to BMI  A review of systems is otherwise negative  We discussed the options for genetic screening, including but not limited to first trimester screening, second trimester screening, combined first and second trimester screening, noninvasive prenatal screening (NIPS) for patients at high risk and diagnostic screening through the use of CVS and amniocentesis  We discussed the risks and benefits of each approach including the sensitivities and false positive rates as well as the difference between a screening test and a diagnostic test   At the conclusion of our discussion the patient elected noninvasive prenatal testing utilizing the [facility] Non-invasive prenatal screening (NIPS) test   The patient had this blood work drawn in the office and the results should be available approximately 7-10 days after her blood draw  Her results will be reported from [facility]  We discussed follow-up in detail and I recommend an anatomy ultrasound be scheduled for 20 weeks gestation  Thank you very much for allowing us to participate in the care of this very nice patient  Should you have any questions, please do not hesitate to contact our office      Please note, in addition to the time spent discussing the results of the ultrasound, I spent approximately 15 minutes of face-to-face time with the patient, greater than 50% of which was spent in counseling and the coordination of care for this patient  Portions of the record may have been created with voice recognition software  Occasional wrong word or "sound a like" substitutions may have occurred due to the inherent limitations of voice recognition software  Read the chart carefully and recognize, using context, where substitutions have occurred  Lei Willoughby MD  Attending Physician, Abi

## 2022-07-08 NOTE — LETTER
July 8, 2022     Maryann Joseph MD  [address] [facility]  [address]  [facility] [address]    Patient: Bon Christianson   YOB: 1990   Date of Visit: 7/8/2022       Dear Dr Sayda Lindquist:    Thank you for referring Madison Avitia to me for evaluation  Below are my notes for this consultation  If you have questions, please do not hesitate to call me  I look forward to following your patient along with you  Sincerely,        Claudia Chin MD        CC: No Recipients  Claudia Chin MD  7/8/2022  3:45 PM  Sign when Signing Visit  CONSULT NOTE    Maryann Joseph MD  [place] [address]  [address]  [place],  [address] [place] [address]     Thank you for referring your Bon Christianson for a Maternal-Fetal Medicine Consultation:  Below is my consultation  Thank you very much for requesting a consultation on this very nice patient for the indication of genetic screening  This is the patient's 1st pregnancy  Other than a history of wisdom teeth extraction, she has no other significant medical or surgical history  She has no known drug allergies  Her substance use history is unremarkable  Family history is only significant for her father with hypertension related to BMI  A review of systems is otherwise negative  We discussed the options for genetic screening, including but not limited to first trimester screening, second trimester screening, combined first and second trimester screening, noninvasive prenatal screening (NIPS) for patients at high risk and diagnostic screening through the use of CVS and amniocentesis    We discussed the risks and benefits of each approach including the sensitivities and false positive rates as well as the difference between a screening test and a diagnostic test   At the conclusion of our discussion the patient elected noninvasive prenatal testing utilizing the [facility] Non-invasive prenatal screening (NIPS) test   The patient had this blood work drawn in the office and the results should be available approximately 7-10 days after her blood draw  Her results will be reported from [facility]  We discussed follow-up in detail and I recommend an anatomy ultrasound be scheduled for 20 weeks gestation  Thank you very much for allowing us to participate in the care of this very nice patient  Should you have any questions, please do not hesitate to contact our office  Please note, in addition to the time spent discussing the results of the ultrasound, I spent approximately 15 minutes of face-to-face time with the patient, greater than 50% of which was spent in counseling and the coordination of care for this patient  Portions of the record may have been created with voice recognition software  Occasional wrong word or "sound a like" substitutions may have occurred due to the inherent limitations of voice recognition software  Read the chart carefully and recognize, using context, where substitutions have occurred  Lei Colvin MD  Attending Physician, Abi

## 2022-07-18 ENCOUNTER — ROUTINE PRENATAL (OUTPATIENT)
Dept: OBGYN CLINIC | Facility: CLINIC | Age: 32
End: 2022-07-18

## 2022-07-18 VITALS — SYSTOLIC BLOOD PRESSURE: 116 MMHG | BODY MASS INDEX: 23.01 KG/M2 | DIASTOLIC BLOOD PRESSURE: 68 MMHG | WEIGHT: 165 LBS

## 2022-07-18 DIAGNOSIS — Z34.02 ENCOUNTER FOR SUPERVISION OF NORMAL FIRST PREGNANCY IN SECOND TRIMESTER: Primary | ICD-10-CM

## 2022-07-18 DIAGNOSIS — Z3A.15 15 WEEKS GESTATION OF PREGNANCY: ICD-10-CM

## 2022-07-18 PROCEDURE — PNV: Performed by: OBSTETRICS & GYNECOLOGY

## 2022-07-18 NOTE — PROGRESS NOTES
This is a 28 y o  Kentrell Medina at 15w0d who presents for return OB visit  No complaints  Maybe feeling flutters  BP: 116/68 TWlb    Normal NT and NIPT  AFP ordered     Level 2 US scheduled  F/up 4 wk

## 2022-07-19 LAB — MISCELLANEOUS LAB TEST RESULT: NORMAL

## 2022-07-26 ENCOUNTER — APPOINTMENT (OUTPATIENT)
Dept: LAB | Facility: CLINIC | Age: 32
End: 2022-07-26
Payer: COMMERCIAL

## 2022-07-26 DIAGNOSIS — Z34.01 ENCOUNTER FOR SUPERVISION OF NORMAL FIRST PREGNANCY IN FIRST TRIMESTER: ICD-10-CM

## 2022-07-26 DIAGNOSIS — Z31.430 ENCOUNTER OF FEMALE FOR TESTING FOR GENETIC DISEASE CARRIER STATUS FOR PROCREATIVE MANAGEMENT: ICD-10-CM

## 2022-07-26 PROCEDURE — 82105 ALPHA-FETOPROTEIN SERUM: CPT

## 2022-07-26 PROCEDURE — 36415 COLL VENOUS BLD VENIPUNCTURE: CPT

## 2022-07-26 PROCEDURE — 81329 SMN1 GENE DOS/DELETION ALYS: CPT

## 2022-07-26 PROCEDURE — 81220 CFTR GENE COM VARIANTS: CPT

## 2022-07-28 LAB
2ND TRIMESTER 4 SCREEN SERPL-IMP: NORMAL
AFP ADJ MOM SERPL: 1.15
AFP INTERP AMN-IMP: NORMAL
AFP INTERP SERPL-IMP: NORMAL
AFP INTERP SERPL-IMP: NORMAL
AFP SERPL-MCNC: 38.5 NG/ML
AGE AT DELIVERY: 31.4 YR
GA METHOD: NORMAL
GA: 16.1 WEEKS
IDDM PATIENT QL: NO
MULTIPLE PREGNANCY: NO
NEURAL TUBE DEFECT RISK FETUS: 7603 %

## 2022-08-03 LAB
CF COMMENT: NORMAL
CFTR MUT ANL BLD/T: NORMAL

## 2022-08-15 ENCOUNTER — ROUTINE PRENATAL (OUTPATIENT)
Dept: OBGYN CLINIC | Facility: CLINIC | Age: 32
End: 2022-08-15

## 2022-08-15 VITALS — WEIGHT: 169 LBS | SYSTOLIC BLOOD PRESSURE: 114 MMHG | DIASTOLIC BLOOD PRESSURE: 62 MMHG | BODY MASS INDEX: 23.57 KG/M2

## 2022-08-15 DIAGNOSIS — Z3A.19 19 WEEKS GESTATION OF PREGNANCY: Primary | ICD-10-CM

## 2022-08-15 DIAGNOSIS — Z34.02 ENCOUNTER FOR SUPERVISION OF NORMAL FIRST PREGNANCY IN SECOND TRIMESTER: ICD-10-CM

## 2022-08-15 LAB
CLINICAL INFO: NORMAL
ETHNIC BACKGROUND STATED: NORMAL
GENE MUT TESTED BLD/T: NORMAL
GENERAL COMMENTS:: NORMAL
LAB DIRECTOR NAME PROVIDER: NORMAL
REASON FOR REFERRAL (NARRATIVE): NORMAL
REF LAB TEST METHOD: NORMAL
SL AMB DISCLAIMER: NORMAL
SL AMB GENETIC COUNSELOR: NORMAL
SMN1 GENE MUT ANL BLD/T: NORMAL
SPECIMEN SOURCE: NORMAL

## 2022-08-15 PROCEDURE — PNV: Performed by: OBSTETRICS & GYNECOLOGY

## 2022-08-15 NOTE — PROGRESS NOTES
33 y o   female at 19w0d (Estimated Date of Delivery: 23) for PNV  Pre-Darling Vitals    Flowsheet Row Most Recent Value   Prenatal Assessment    Fetal Heart Rate 155   Movement Present   Prenatal Vitals    Blood Pressure 114/62   Weight - Scale 76 7 kg (169 lb)   Urine Albumin/Glucose    Dilation/Effacement/Station    Vaginal Drainage    Edema    LLE Edema None   RLE Edema None   Facial Edema None         kg (10 lb 4 3 oz)  Recent labs reviewed  Sleeping on one side, sometimes on her back but has a small wedge  Ok to get her hair dyed  Not sure of movement yet - sometimes tickle sensations  Anatomy scan scheduled  Denies  Cramping/lof or bleeding  RTO in 4 weeks

## 2022-08-23 ENCOUNTER — EVALUATION (OUTPATIENT)
Dept: PHYSICAL THERAPY | Facility: REHABILITATION | Age: 32
End: 2022-08-23
Payer: COMMERCIAL

## 2022-08-23 DIAGNOSIS — R29.898 PELVIC GIRDLE WEAKNESS: Primary | ICD-10-CM

## 2022-08-23 PROCEDURE — 97112 NEUROMUSCULAR REEDUCATION: CPT | Performed by: PHYSICAL THERAPIST

## 2022-08-23 PROCEDURE — 97162 PT EVAL MOD COMPLEX 30 MIN: CPT | Performed by: PHYSICAL THERAPIST

## 2022-08-23 NOTE — PROGRESS NOTES
PT Evaluation     Today's date: 2022  Patient name: Edouard Jean Baptiste  : 1990  MRN: [id number]  Referring provider: Jake Sharma PT  Dx:   Encounter Diagnosis     ICD-10-CM    1  Pelvic girdle weakness  [id number]        Start Time: 1500  Stop Time: 1600  Total time in clinic (min): 60 minutes    Assessment  Assessment details: The patient is a 33 y o  female who seeks physical therapy direct access, with complaints of some core weakness  She is 20 weeks pregnant in her first pregnancy  Her history includes some pelvic floor muscle dysfunction  She presents with some pelvic girdle instability  She would benefit from pelvic floor physical therapy to help reduce/manage pain and symptoms, address impairments and maximize function and quality of life upon discharge  She will be given updated HEP throughout episode of care  Thank you for the referral     Therapeutic activities performed upon examination included education regarding pelvic floor anatomy, explanation of exam technique, explanation of exam findings and discussion of treatment plan as well as expectations of the patient to emphasize the importance of compliance and adherence to physical therapy visits  Impairments: abnormal coordination, abnormal or restricted ROM, activity intolerance, impaired physical strength, lacks appropriate home exercise program, pain with function, poor posture  and poor body mechanics  Understanding of Dx/Px/POC: good  Goals  ST  The patient will maintain bilateral hip/pelvic girdle strength to help with her mobility and transfers in 8 to 12 weeks  2  The patient will reduce round ligament pain by 50% frequency in 8 to 12 weeks  LT  The patient will be independent with HEP upon discharge to help manage her symptoms and maintain her activity levels upon discharge     2  The patient will have minimal pain with transfers and bed mobility utilizing protective joint strategies for movement upon discharge  Plan  Patient would benefit from: skilled physical therapy  Planned modality interventions: biofeedback  Planned therapy interventions: activity modification, abdominal trunk stabilization, behavior modification, body mechanics training, breathing training, coordination, flexibility, functional ROM exercises, IADL retraining, manual therapy, neuromuscular re-education, patient education, postural training, self care, strengthening, therapeutic activities and therapeutic exercise  Frequency: 1x week  Duration in visits: 12  Duration in weeks: 12  Plan of Care beginning date: 8/23/2022  Plan of Care expiration date: 11/15/2022  Treatment plan discussed with: patient        PT Pelvic Floor Subjective:   History of Present Illness: The patient is 20 weeks pregnant currently  She reports that overall she feels well  She started experiences headaches one month ago but this has been better with increased water intake and eating smaller things more frequently throughout the day  She is having some round ligament pain, especially if she moves too quickly and transitioning from sit to stand  She is exercising 4-5 times a week, with light to moderate intensity  She is modifying her exercises as needed  She is walking more consistently  She is have some increased bladder frequency at this point  No real issues with low back pain or SIJ pain  She does feel that she does not have the same inner control at this point in her pregnancy  Her goal is to continue to stay active and prevent decline     Social Support:     Lives with:  Significant other    Work status: employed full time (Physical Therapist)  Diet and Exercise:      Exercise type: combination activity    4-5 days a week  OB/ gyn History    Gestational History:     Number of prior pregnancies: 1    Menstrual History:    Date of last menstrual period: 4/5/2022  Bladder Function:     Voiding Difficulties positive for: frequent urination      Voiding Difficulties negative for: urgency, straining and incomplete emptying      Voiding Difficulties comments:     Voiding frequency: every 1-2 hours    Urinary leakage: no urine leakage    Nocturia (episodes per night): 1 and 0    Painful urination: No      Fluid Intake Type: Water and milk    Intake (ounces): Intake (ounces) comment: Water: 4, 16 ounce bottles of water a day; goal of 60-80 ounces a day  [place] water on occasion  OJ in the morning  Milk - 1 glass every day  Bowel Function:     Bowel Function comments:  Normal - once a day in the morning; BSS Type 4;   Currently - once a day to once every other day BSS Type 2-3  Occasional straining    Bowel frequency: daily and every 2 days  Sexual Function:     Sexually Active:  Sexually active    Pain during intercourse: No    Pain:     No pain reported by patient  Objective     Static Posture     Shoulders  Rounded  Tenderness     Left Hip   No tenderness in the ASIS, PSIS, greater trochanter, pubic tubercle and sacroiliac joint  Right Hip   No tenderness in the ASIS, PSIS, greater trochanter, pubic tubercle and sacroiliac joint       Neurological Testing     Sensation     Lumbar   Left   Intact: light touch    Right   Intact: light touch    Reflexes   Left   Patellar (L4): normal (2+)  Achilles (S1): normal (2+)    Right   Patellar (L4): normal (2+)  Achilles (S1): normal (2+)    Strength/Myotome Testing     Left Hip   Planes of Motion   Flexion: 4  Abduction: 4  Adduction: 4  External rotation: 4-  Internal rotation: 4-    Right Hip   Planes of Motion   Flexion: 4  Abduction: 4  Adduction: 4  External rotation: 4-  Internal rotation: 4-    Left Knee   Flexion: 4  Extension: 4    Right Knee   Flexion: 4  Extension: 4    Left Ankle/Foot   Dorsiflexion: 4    Right Ankle/Foot   Dorsiflexion: 4    Additional Strength Details  Able to heel walk and toe walk    General Comments:      Lumbar Comments  Pelvic Girdle Stability tests:  [place] SLS: R: some instability noted  L: some instability noted                  Precautions: monitor vitals; watch supine hypotension  [employer] HEP:    Manuals 8/23            Bilateral Hip Stretching/PROM             STM             K-tape             VITALS             Neuro Re-Ed             TA ADIM 5"x10            PFMC: Slow Holds 10x            Glute isometrics             TA + hip adduction isometrics 6x            TA + hip abduction isometrics 6x                                      Ther Ex             Hamstring stretch seated             gastroc stretch seated             TA + Seated scap retractions             Theraband rows                                                                 Ther Activity             Transfer training             Education             Gait Training                                       Modalities

## 2022-08-26 ENCOUNTER — ROUTINE PRENATAL (OUTPATIENT)
Dept: PERINATAL CARE | Facility: OTHER | Age: 32
End: 2022-08-26
Payer: COMMERCIAL

## 2022-08-26 VITALS
BODY MASS INDEX: 23.61 KG/M2 | SYSTOLIC BLOOD PRESSURE: 115 MMHG | DIASTOLIC BLOOD PRESSURE: 70 MMHG | WEIGHT: 168.65 LBS | HEART RATE: 77 BPM | HEIGHT: 71 IN

## 2022-08-26 DIAGNOSIS — Z36.86 ENCOUNTER FOR ANTENATAL SCREENING FOR CERVICAL LENGTH: ICD-10-CM

## 2022-08-26 DIAGNOSIS — Z36.3 ENCOUNTER FOR ANTENATAL SCREENING FOR MALFORMATIONS: ICD-10-CM

## 2022-08-26 DIAGNOSIS — O98.511 COVID-19 AFFECTING PREGNANCY IN FIRST TRIMESTER: Primary | ICD-10-CM

## 2022-08-26 DIAGNOSIS — Z3A.20 20 WEEKS GESTATION OF PREGNANCY: ICD-10-CM

## 2022-08-26 DIAGNOSIS — U07.1 COVID-19 AFFECTING PREGNANCY IN FIRST TRIMESTER: Primary | ICD-10-CM

## 2022-08-26 DIAGNOSIS — O43.199 MARGINAL INSERTION OF UMBILICAL CORD AFFECTING MANAGEMENT OF MOTHER: ICD-10-CM

## 2022-08-26 PROCEDURE — 76817 TRANSVAGINAL US OBSTETRIC: CPT | Performed by: OBSTETRICS & GYNECOLOGY

## 2022-08-26 PROCEDURE — 99213 OFFICE O/P EST LOW 20 MIN: CPT | Performed by: OBSTETRICS & GYNECOLOGY

## 2022-08-26 PROCEDURE — 76811 OB US DETAILED SNGL FETUS: CPT | Performed by: OBSTETRICS & GYNECOLOGY

## 2022-08-26 NOTE — PROGRESS NOTES
Susana Galvan  has no complaints today at 20w4d  She reports fetal movements and does not report any vaginal bleeding or signs of labor  Her recently completed fetal testing revealed a normal peak NIPT and MSAFP    Problem list:  1  History of a COVID infection in the 1st trimester  She reports she has completed the COVID vaccine  Ultrasound findings: The ultrasound today shows normal interval fetal growth and fluid, normal cervical length, and no malformations were detected  The placental cord insertion is marginal   We were unable to complete the anatomical survey secondary to fetal position  Pregnancy ultrasound has limitations and is unable to detect all forms of fetal congenital abnormalities  Specific counseling was provided on the following problems:  1  We reviewed that a marginal cord insertion can be associated with an increased risk for fetal growth lag  Recommend a 32 week growth scan  2  History of a COVID infection in pregnancy-recommend a 32 week growth scan  Follow up recommended:   1  Recommend a 32 week ultrasound for growth and missed anatomy  Pre visit time reviewing her records   5 minutes  Face to face time 5 minutes  Post visit time on documentation of note, updating her problem list, adding orders and prescriptions 5 minutes  Procedures that were completed today were charged separately  The level of decision making was straight forward      Marquis Tiffani MD

## 2022-08-26 NOTE — PROGRESS NOTES
Ultrasound Probe Disinfection    A transvaginal ultrasound was performed  Prior to use, disinfection was performed with High Level Disinfection Process (Trophon)  Probe serial number A2: [id number] was used        Logan Post , ÁLVARO  08/26/22  1:46 PM

## 2022-08-26 NOTE — LETTER
August 26, 2022     Chaka Benson MD  [place] [address]  [address]  [facility]    Patient: Cat Avelar   YOB: 1990   Date of Visit: 8/26/2022       Dear Dr Barb Blanco:    Thank you for referring Acosta Flores to me for evaluation  Below are my notes for this consultation  If you have questions, please do not hesitate to call me  I look forward to following your patient along with you  Sincerely,        Ismael Zuñiga MD        CC: No Recipients  Ismael Zuñiga MD  8/26/2022  9:32 PM  Sign when Signing Visit  Cat Avelar  has no complaints today at 20w4d  She reports fetal movements and does not report any vaginal bleeding or signs of labor  Her recently completed fetal testing revealed a normal peak NIPT and MSAFP    Problem list:  1  History of a COVID infection in the 1st trimester  She reports she has completed the COVID vaccine  Ultrasound findings: The ultrasound today shows normal interval fetal growth and fluid, normal cervical length, and no malformations were detected  The placental cord insertion is marginal   We were unable to complete the anatomical survey secondary to fetal position  Pregnancy ultrasound has limitations and is unable to detect all forms of fetal congenital abnormalities  Specific counseling was provided on the following problems:  1  We reviewed that a marginal cord insertion can be associated with an increased risk for fetal growth lag  Recommend a 32 week growth scan  2  History of a COVID infection in pregnancy-recommend a 32 week growth scan  Follow up recommended:   1  Recommend a 32 week ultrasound for growth and missed anatomy  Pre visit time reviewing her records   5 minutes  Face to face time 5 minutes  Post visit time on documentation of note, updating her problem list, adding orders and prescriptions 5 minutes  Procedures that were completed today were charged separately     The level of decision making was straight forward      Kin MD Karishma

## 2022-08-26 NOTE — PATIENT INSTRUCTIONS
Thank you for choosing us for your  care today  If you have any questions about your ultrasound or care, please do not hesitate to contact us or your primary obstetrician  Some general instructions for your pregnancy are:    Protect against coronavirus: get vaccinated - pregnant women are increased risk of severe COVID  Notify your primary care doctor if you have any symptoms  Exercise: Aim for 22 minutes per day (150 minutes per week) of regular exercise  Walking is great! Nutrition: aim for calcium-rich and iron-rich foods as well as healthy sources of protein  Learn about Preeclampsia: preeclampsia is a common, serious high blood pressure complication in pregnancy  A blood pressure of 315UNRO (systolic or top number) or 26EJGH (diastolic or bottom number) is not normal and needs evaluation by your doctor  Aspirin is sometimes prescribed in early pregnancy to prevent preeclampsia in women with risk factors - ask your obstetrician if you should be on this medication  If you smoke, try to reduce how many cigarettes you smoke or try to quit completely  Do not vape  Other warning signs to watch out for in pregnancy or postpartum: chest pain, obstructed breathing or shortness of breath, seizures, thoughts of hurting yourself or your baby, bleeding, a painful or swollen leg, fever, or headache (see AWHONN POST-BIRTH Warning Signs campaign)  If these happen call 911  Itching is also not normal in pregnancy and if you experience this, especially over your hands and feet, potentially worse at night, notify your doctors

## 2022-09-06 ENCOUNTER — OFFICE VISIT (OUTPATIENT)
Dept: PHYSICAL THERAPY | Facility: REHABILITATION | Age: 32
End: 2022-09-06
Payer: COMMERCIAL

## 2022-09-06 DIAGNOSIS — R29.898 PELVIC GIRDLE WEAKNESS: Primary | ICD-10-CM

## 2022-09-06 PROCEDURE — 97112 NEUROMUSCULAR REEDUCATION: CPT | Performed by: PHYSICAL THERAPIST

## 2022-09-06 PROCEDURE — 97110 THERAPEUTIC EXERCISES: CPT | Performed by: PHYSICAL THERAPIST

## 2022-09-06 NOTE — PROGRESS NOTES
Daily Note     Today's date: 2022  Patient name: Yari Storey  : 1990  MRN: [id number]  Referring provider: Rozina Jean, PT  Dx:   Encounter Diagnosis     ICD-10-CM    1  Pelvic girdle weakness  R29 898        Start Time: 44  Stop Time: 57  Total time in clinic (min): 54 minutes    Subjective: Patient is currently 22 weeks pregnant  She reports she is feeling good overall  She can have some round ligament pain when standing from a lower surface  She denies incontinence but feels like she could have leakage  Objective: See treatment diary below        Assessment: Tolerated treatment well  Initiated plan of care this visit with patient tolerating well  Progressed deep core engagement and hip isometrics to seated/standing positioning  Will benefit from progression of deep core engagement to functional positions for proper body mechanics and maintenance of current activity level  Patient demonstrated fatigue post treatment, exhibited good technique with therapeutic exercises and would benefit from continued PT  Plan: Continue per plan of care            Precautions: monitor vitals; watch supine hypotension  [employer] HEP:    Manuals            TrP releases  Assess nv           Assess ASLR   nv           Bilateral Hip Stretching/PROM             STM             K-tape             VITALS             Neuro Re-Ed             TA ADIM 5"x10 10x5"            PFMC: Slow Holds 10x x10           TA + hip adduction isometrics 6x 10x5" seated           TA + hip abduction isometrics 6x GTB 10x5" seated           TA seated on pball             TA+PPT  10x5"                                     Ther Ex             Seated piriformis stretch  3x30"            Hamstring stretch seated  3x10"            gastroc stretch seated             TA + Seated scap retractions             TA+Theraband rows  BTB 2x10                                                               Ther Activity Transfer training             Education             Round ligament   - deep core  - PPT             TA+PFMC+deadlift  nv           TA+PFMC+mini squat  nv           Vitals             BP  98/58

## 2022-09-08 ENCOUNTER — APPOINTMENT (OUTPATIENT)
Dept: PHYSICAL THERAPY | Facility: REHABILITATION | Age: 32
End: 2022-09-08
Payer: COMMERCIAL

## 2022-09-13 ENCOUNTER — OFFICE VISIT (OUTPATIENT)
Dept: PHYSICAL THERAPY | Facility: REHABILITATION | Age: 32
End: 2022-09-13
Payer: COMMERCIAL

## 2022-09-13 ENCOUNTER — APPOINTMENT (OUTPATIENT)
Dept: PHYSICAL THERAPY | Facility: REHABILITATION | Age: 32
End: 2022-09-13
Payer: COMMERCIAL

## 2022-09-13 ENCOUNTER — ROUTINE PRENATAL (OUTPATIENT)
Dept: OBGYN CLINIC | Facility: CLINIC | Age: 32
End: 2022-09-13

## 2022-09-13 VITALS — WEIGHT: 176 LBS | SYSTOLIC BLOOD PRESSURE: 112 MMHG | BODY MASS INDEX: 24.55 KG/M2 | DIASTOLIC BLOOD PRESSURE: 68 MMHG

## 2022-09-13 DIAGNOSIS — R29.898 PELVIC GIRDLE WEAKNESS: Primary | ICD-10-CM

## 2022-09-13 DIAGNOSIS — Z3A.23 23 WEEKS GESTATION OF PREGNANCY: ICD-10-CM

## 2022-09-13 DIAGNOSIS — O43.199 MARGINAL INSERTION OF UMBILICAL CORD AFFECTING MANAGEMENT OF MOTHER: Primary | ICD-10-CM

## 2022-09-13 DIAGNOSIS — Z34.02 ENCOUNTER FOR SUPERVISION OF NORMAL FIRST PREGNANCY IN SECOND TRIMESTER: ICD-10-CM

## 2022-09-13 PROCEDURE — PNV: Performed by: OBSTETRICS & GYNECOLOGY

## 2022-09-13 PROCEDURE — 97110 THERAPEUTIC EXERCISES: CPT

## 2022-09-13 PROCEDURE — 97112 NEUROMUSCULAR REEDUCATION: CPT

## 2022-09-13 PROCEDURE — 97530 THERAPEUTIC ACTIVITIES: CPT

## 2022-09-13 NOTE — PROGRESS NOTES
Daily Note     Today's date: 2022  Patient name: Dalia Pérez  : 1990  MRN: [id number]  Referring provider: Balta Sue, PT  Dx:   Encounter Diagnosis     ICD-10-CM    1  Pelvic girdle weakness  R29 898        Start Time: 845  Stop Time: 930  Total time in clinic (min): 45 minutes    Subjective: Patient reports overall she is feeling well  She is 23 weeks pregnant and is to have a follow up appointment with MD today  Patient states she has been going to the gym, trying to be more self aware of [address] 81 with certain TE completed  No complaints after previous session, she reports compliance with HEP  Objective: See treatment diary below      Assessment: Tolerated treatment well  Patient demonstrated fatigue post treatment, exhibited good technique with therapeutic exercises and would benefit from continued PT Good tolerance to added pball for certain TE  Trialed PFMC with sit to stands/mini squats today with patient tolerating well, some challenge noted with coordination/maintaining contraction, with improvements noted upon increased reps  Will continue to progress patient as able        Plan: Continue per plan of care  Progress treatment as tolerated             Precautions: monitor vitals; watch supine hypotension  [employer] HEP:    Manuals           TrP releases  Assess nv           Assess ASLR   nv           Bilateral Hip Stretching/PROM             STM             K-tape             VITALS             Neuro Re-Ed             TA ADIM 5"x10 10x5"  10x5''           PFMC: Slow Holds 10x x10 x10          TA + hip adduction isometrics 6x 10x5" seated 10x5''  pball  PFMC          TA + hip abduction isometrics 6x GTB 10x5" seated GTB pball 10x5''          TA seated on pball             TA+PPT  10x5" 10x5''                                    Ther Ex             Seated piriformis stretch  3x30"  3x30''          Hamstring stretch seated  3x10"  3x20''          gastroc stretch seated TA + Seated scap retractions             TA+Theraband rows  BTB 2x10 nv                                                              Ther Activity             Transfer training             Education             TA+PFMC sit to stand   x5          Round ligament   - deep core  - PPT             TA+PFMC+deadlift  nv nv          TA+PFMC+mini squat  nv x5          Vitals             BP  98/58

## 2022-09-13 NOTE — PROGRESS NOTES
33 y o    female at 22 2 wga EGA for PNV  BP : 112/68  TW  Feeling well    No complaints  Reviewed the finding of marginal cord insertion - growth at 32 wks  Reviewed PTL precautions   Gave 28 week labs  F/u 4 weeks

## 2022-09-15 ENCOUNTER — APPOINTMENT (OUTPATIENT)
Dept: PHYSICAL THERAPY | Facility: REHABILITATION | Age: 32
End: 2022-09-15
Payer: COMMERCIAL

## 2022-09-27 ENCOUNTER — APPOINTMENT (OUTPATIENT)
Dept: LAB | Facility: CLINIC | Age: 32
End: 2022-09-27
Payer: COMMERCIAL

## 2022-09-27 DIAGNOSIS — O43.199 MARGINAL INSERTION OF UMBILICAL CORD AFFECTING MANAGEMENT OF MOTHER: ICD-10-CM

## 2022-09-27 LAB
ERYTHROCYTE [DISTWIDTH] IN BLOOD BY AUTOMATED COUNT: 13.7 % (ref 11.6–15.1)
GLUCOSE 1H P 50 G GLC PO SERPL-MCNC: 87 MG/DL (ref 40–134)
HCT VFR BLD AUTO: 34.4 % (ref 34.8–46.1)
HGB BLD-MCNC: 11.9 G/DL (ref 11.5–15.4)
MCH RBC QN AUTO: 31 PG (ref 26.8–34.3)
MCHC RBC AUTO-ENTMCNC: 34.6 G/DL (ref 31.4–37.4)
MCV RBC AUTO: 90 FL (ref 82–98)
PLATELET # BLD AUTO: 235 THOUSANDS/UL (ref 149–390)
PMV BLD AUTO: 10.7 FL (ref 8.9–12.7)
RBC # BLD AUTO: 3.84 MILLION/UL (ref 3.81–5.12)
WBC # BLD AUTO: 12.39 THOUSAND/UL (ref 4.31–10.16)

## 2022-09-27 PROCEDURE — 86592 SYPHILIS TEST NON-TREP QUAL: CPT

## 2022-09-27 PROCEDURE — 36415 COLL VENOUS BLD VENIPUNCTURE: CPT

## 2022-09-27 PROCEDURE — 85027 COMPLETE CBC AUTOMATED: CPT

## 2022-09-27 PROCEDURE — 82950 GLUCOSE TEST: CPT

## 2022-09-28 LAB — RPR SER QL: NORMAL

## 2022-09-29 ENCOUNTER — OFFICE VISIT (OUTPATIENT)
Dept: PHYSICAL THERAPY | Facility: REHABILITATION | Age: 32
End: 2022-09-29
Payer: COMMERCIAL

## 2022-09-29 DIAGNOSIS — R29.898 PELVIC GIRDLE WEAKNESS: Primary | ICD-10-CM

## 2022-09-29 PROCEDURE — 97110 THERAPEUTIC EXERCISES: CPT | Performed by: PHYSICAL THERAPIST

## 2022-09-29 PROCEDURE — 97112 NEUROMUSCULAR REEDUCATION: CPT | Performed by: PHYSICAL THERAPIST

## 2022-09-29 NOTE — PROGRESS NOTES
Daily Note     Today's date: 2022  Patient name: Lida Culp  : 1990  MRN: [id number]  Referring provider: Jane Andrews, PT  Dx:   Encounter Diagnosis     ICD-10-CM    1  Pelvic girdle weakness  R29 898        Start Time:   Stop Time:   Total time in clinic (min): 60 minutes    Subjective: Patient is currently 25 weeks pregnant  She reports occasional round ligament pain on the right side with transitions or at rest  Otherwise no new complaints of pain or symptoms  Objective: See treatment diary below      Assessment: Tolerated treatment well  Incorporated additional TE this visit for deep core and pelvic floor engagement as well as pressure management with good tolerance  Patient demonstrates good form and coordination with breathing and muscle engagement  Some difficulty noted with dynamic movements during core stabilization  Patient demonstrated fatigue post treatment, exhibited good technique with therapeutic exercises and would benefit from continued PT  Plan: Continue per plan of care            Precautions: monitor vitals; watch supine hypotension  [employer] HEP:    Manuals  9         TrP releases  Assess nv           Assess ASLR   nv           Bilateral Hip Stretching/PROM             STM             K-tape             VITALS             Neuro Re-Ed             TA ADIM 5"x10 10x5"  10x5''           PFMC: Slow Holds 10x x10 x10          TA + hip adduction isometrics 6x 10x5" seated 10x5''  pball  PFMC          TA + hip abduction isometrics 6x GTB 10x5" seated GTB pball 10x5''          TA seated on pball    +alternating marches x10         TA+PPT  10x5" 10x5''          Standing PPT + alternating arms    x10                      Ther Ex             Seated piriformis stretch  3x30"  3x30'' 3x30"          Hamstring stretch seated  3x10"  3x20'' 3x30"         TA+Theraband rows  BTB 2x10 nv          Quadruped TA    10x5"         Quadruped TA+leg extensions x10         Pball circles    nv                      Ther Activity             Transfer training             Education             TA+PFMC sit to stand   x5          TA+PFMC+lifting    10# to cabinet         TA+PFMC+deadlift  nv nv 8" step 10# x10         TA+PFMC+mini squat  nv x5          Vitals             BP  98/58  110/70

## 2022-10-11 ENCOUNTER — ROUTINE PRENATAL (OUTPATIENT)
Dept: OBGYN CLINIC | Facility: CLINIC | Age: 32
End: 2022-10-11
Payer: COMMERCIAL

## 2022-10-11 VITALS — BODY MASS INDEX: 25.38 KG/M2 | SYSTOLIC BLOOD PRESSURE: 108 MMHG | WEIGHT: 182 LBS | DIASTOLIC BLOOD PRESSURE: 62 MMHG

## 2022-10-11 DIAGNOSIS — O43.192 MARGINAL INSERTION OF UMBILICAL CORD AFFECTING MANAGEMENT OF MOTHER IN SECOND TRIMESTER: Primary | ICD-10-CM

## 2022-10-11 DIAGNOSIS — Z3A.27 27 WEEKS GESTATION OF PREGNANCY: ICD-10-CM

## 2022-10-11 DIAGNOSIS — Z23 NEED FOR INFLUENZA VACCINATION: ICD-10-CM

## 2022-10-11 LAB
DME PARACHUTE DELIVERY DATE REQUESTED: NORMAL
DME PARACHUTE ITEM DESCRIPTION: NORMAL
DME PARACHUTE ORDER STATUS: NORMAL
DME PARACHUTE SUPPLIER NAME: NORMAL
DME PARACHUTE SUPPLIER PHONE: NORMAL

## 2022-10-11 PROCEDURE — PNV: Performed by: PHYSICIAN ASSISTANT

## 2022-10-11 PROCEDURE — 90471 IMMUNIZATION ADMIN: CPT | Performed by: PHYSICIAN ASSISTANT

## 2022-10-11 PROCEDURE — 90686 IIV4 VACC NO PRSV 0.5 ML IM: CPT | Performed by: PHYSICIAN ASSISTANT

## 2022-10-11 NOTE — PROGRESS NOTES
Patient is a 32 YO  female presenting to the office at 27 1 weeks for routine OB care     BP: 108/62  TWlb  Fetal Movement: yes good movement  Discussed third trimester teaching  Reviewed fetal kick counts, normal FM  Reviewed signs of PTL  Reviewed red folder, consents, birth plan  Delivery consent obtained   Breastfeeding: plans to  Breast Pump: ordered  TDAP: will give next visit  FLU Vaccine: received today  COVID Vaccine: already vaccinated  28 Week Labs: WNL  Has 32 week growth f/u with MFM for MCI  RTO 2 weeks for routine OB F/U

## 2022-10-11 NOTE — PROGRESS NOTES
Red folder due today  TDAP offered - next visit  FLU offered and accepted  FLU given in left deltoid without difficulty, patient tolerated shot well  Breast pump submitted  Patient would like to discuss blood work results  Urine dip neg/neg

## 2022-10-13 ENCOUNTER — APPOINTMENT (OUTPATIENT)
Dept: PHYSICAL THERAPY | Facility: REHABILITATION | Age: 32
End: 2022-10-13

## 2022-10-21 ENCOUNTER — ROUTINE PRENATAL (OUTPATIENT)
Dept: OBGYN CLINIC | Facility: CLINIC | Age: 32
End: 2022-10-21
Payer: COMMERCIAL

## 2022-10-21 VITALS — SYSTOLIC BLOOD PRESSURE: 98 MMHG | BODY MASS INDEX: 25.52 KG/M2 | DIASTOLIC BLOOD PRESSURE: 64 MMHG | WEIGHT: 183 LBS

## 2022-10-21 DIAGNOSIS — O43.199 MARGINAL INSERTION OF UMBILICAL CORD AFFECTING MANAGEMENT OF MOTHER: Primary | ICD-10-CM

## 2022-10-21 DIAGNOSIS — Z23 NEED FOR TDAP VACCINATION: ICD-10-CM

## 2022-10-21 DIAGNOSIS — Z3A.23 23 WEEKS GESTATION OF PREGNANCY: ICD-10-CM

## 2022-10-21 DIAGNOSIS — Z34.02 ENCOUNTER FOR SUPERVISION OF NORMAL FIRST PREGNANCY IN SECOND TRIMESTER: ICD-10-CM

## 2022-10-21 PROCEDURE — 90715 TDAP VACCINE 7 YRS/> IM: CPT | Performed by: OBSTETRICS & GYNECOLOGY

## 2022-10-21 PROCEDURE — 90471 IMMUNIZATION ADMIN: CPT | Performed by: OBSTETRICS & GYNECOLOGY

## 2022-10-21 PROCEDURE — PNV: Performed by: OBSTETRICS & GYNECOLOGY

## 2022-10-21 NOTE — PROGRESS NOTES
33 y o   female at 33w3d (Estimated Date of Delivery: 23) for PNV      Pre-Darling Vitals    Flowsheet Row Most Recent Value   Prenatal Assessment    Movement Present   Prenatal Vitals    Blood Pressure 98/64   Weight - Scale 83 kg (183 lb)   Urine Albumin/Glucose    Dilation/Effacement/Station    Vaginal Drainage    Edema    LLE Edema None   RLE Edema None   Facial Edema None        TW kg (24 lb 4 3 oz)  Growth u/s at 32 weeks  tdap today   Doing well overall

## 2022-10-27 ENCOUNTER — OFFICE VISIT (OUTPATIENT)
Dept: PHYSICAL THERAPY | Facility: REHABILITATION | Age: 32
End: 2022-10-27
Payer: COMMERCIAL

## 2022-10-27 DIAGNOSIS — R29.898 PELVIC GIRDLE WEAKNESS: Primary | ICD-10-CM

## 2022-10-27 PROCEDURE — 97110 THERAPEUTIC EXERCISES: CPT | Performed by: PHYSICAL THERAPIST

## 2022-10-27 NOTE — PROGRESS NOTES
Daily Note     Today's date: 10/27/2022  Patient name: Haylee Espana  : 1990  MRN: [id number]  Referring provider: Courtney Quintanilla, PT  Dx:   Encounter Diagnosis     ICD-10-CM    1  Pelvic girdle weakness  R29 898        Start Time:   Stop Time:   Total time in clinic (min): 47 minutes    Subjective: Patient is currently 29 weeks pregnant  She has some right sided posterior rib pain and has been having some difficulty sleeping at night/finding a comfortable position  She reports no low back pain or incontinence  Objective: See treatment diary below      Assessment: Tolerated treatment well  Patient exhibited good technique with therapeutic exercises and would benefit from continued PT  Plan: Continue per plan of care            Precautions: monitor vitals; watch supine hypotension  [employer] HEP:    Manuals 8/23 9/6 9/13 9/29 10/27        TrP releases  Assess nv           Assess ASLR   nv           Bilateral Hip Stretching/PROM             STM             K-tape             VITALS             Neuro Re-Ed             TA ADIM 5"x10 10x5"  10x5''           PFMC: Slow Holds 10x x10 x10          TA + hip adduction isometrics 6x 10x5" seated 10x5''  pball  PFMC          TA + hip abduction isometrics 6x GTB 10x5" seated GTB pball 10x5''          TA seated on pball    +alternating marches x10 TA+PFMC x10        TA+PPT  10x5" 10x5''          Standing PPT + alternating arms    x10                      Ther Ex             Seated piriformis stretch  3x30"  3x30'' 3x30"  3x30"         Hamstring stretch seated  3x10"  3x20'' 3x30" 3x30"         TA+Theraband rows  BTB 2x10 nv  nv        Quadruped TA    10x5" 10x5"         Quadruped TA+leg extensions    x10 x10        Standing sidebending stretch w pball     5x10" b/l        Pball circles    nv 20 ea        Pball pelvic tilts     20 ea        Ther Activity             Transfer training             Education             TA+PFMC sit to stand   x5 TA+PFMC+lifting    10# to cabinet         TA+PFMC+deadlift  nv nv 8" step 10# x10         TA+PFMC+mini squat  nv x5          Vitals             BP  98/58  110/70 98/52

## 2022-11-04 ENCOUNTER — ROUTINE PRENATAL (OUTPATIENT)
Dept: OBGYN CLINIC | Facility: CLINIC | Age: 32
End: 2022-11-04

## 2022-11-04 VITALS — WEIGHT: 182 LBS | SYSTOLIC BLOOD PRESSURE: 118 MMHG | BODY MASS INDEX: 25.38 KG/M2 | DIASTOLIC BLOOD PRESSURE: 68 MMHG

## 2022-11-04 DIAGNOSIS — O43.199 MARGINAL INSERTION OF UMBILICAL CORD AFFECTING MANAGEMENT OF MOTHER: Primary | ICD-10-CM

## 2022-11-04 NOTE — PROGRESS NOTES
33 y o   female at 30w4d (Estimated Date of Delivery: 23) for PNV      Pre-Darling Vitals    Flowsheet Row Most Recent Value   Prenatal Assessment    Movement Present   Prenatal Vitals    Blood Pressure 118/68   Weight - Scale 82 6 kg (182 lb)   Urine Albumin/Glucose    Dilation/Effacement/Station    Vaginal Drainage    Edema    LLE Edema None   RLE Edema None   Facial Edema None        TWG: 10 6 kg (23 lb 4 3 oz)  Doing well 3rd trimester  Growth u/s in 2 weeks  Reviewed fetal kick counts

## 2022-11-10 LAB
DME PARACHUTE DELIVERY DATE ACTUAL: NORMAL
DME PARACHUTE DELIVERY DATE REQUESTED: NORMAL
DME PARACHUTE ITEM DESCRIPTION: NORMAL
DME PARACHUTE ORDER STATUS: NORMAL
DME PARACHUTE SUPPLIER NAME: NORMAL
DME PARACHUTE SUPPLIER PHONE: NORMAL

## 2022-11-15 ENCOUNTER — ULTRASOUND (OUTPATIENT)
Dept: PERINATAL CARE | Facility: OTHER | Age: 32
End: 2022-11-15

## 2022-11-15 ENCOUNTER — ROUTINE PRENATAL (OUTPATIENT)
Dept: OBGYN CLINIC | Facility: CLINIC | Age: 32
End: 2022-11-15

## 2022-11-15 VITALS
DIASTOLIC BLOOD PRESSURE: 70 MMHG | BODY MASS INDEX: 26.23 KG/M2 | SYSTOLIC BLOOD PRESSURE: 110 MMHG | HEART RATE: 74 BPM | HEIGHT: 71 IN | WEIGHT: 187.39 LBS

## 2022-11-15 VITALS — DIASTOLIC BLOOD PRESSURE: 62 MMHG | BODY MASS INDEX: 26.08 KG/M2 | WEIGHT: 187 LBS | SYSTOLIC BLOOD PRESSURE: 112 MMHG

## 2022-11-15 DIAGNOSIS — Z3A.32 32 WEEKS GESTATION OF PREGNANCY: ICD-10-CM

## 2022-11-15 DIAGNOSIS — O43.199 MARGINAL INSERTION OF UMBILICAL CORD AFFECTING MANAGEMENT OF MOTHER: Primary | ICD-10-CM

## 2022-11-15 NOTE — PROGRESS NOTES
[address]: Ms Sebastian Delgado was seen today for fetal growth assessment ultrasound  See ultrasound report under "OB Procedures" tab  The time spent on this established patient on the encounter date included 4 minutes previsit service time reviewing records and precharting, 6 minutes face-to-face service time counseling regarding results and coordinating care, and  5 minutes charting, totalling 15 minutes    Please don't hesitate to contact our office with any concerns or questions   -Cy Moore MD

## 2022-11-15 NOTE — PROGRESS NOTES
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

## 2022-11-15 NOTE — PATIENT INSTRUCTIONS
Thank you for choosing us for your  care today  If you have any questions about your ultrasound or care, please do not hesitate to contact us or your primary obstetrician  Some general instructions for your pregnancy are:    Protect against coronavirus: get vaccinated - pregnant women are increased risk of severe COVID  Notify your primary care doctor if you have any symptoms  Exercise: Aim for 22 minutes per day (150 minutes per week) of regular exercise  Walking is great! Nutrition: aim for calcium-rich and iron-rich foods as well as healthy sources of protein  Learn about Preeclampsia: preeclampsia is a common, serious high blood pressure complication in pregnancy  A blood pressure of 605GGJG (systolic or top number) or 10KSWE (diastolic or bottom number) is not normal and needs evaluation by your doctor  Aspirin is sometimes prescribed in early pregnancy to prevent preeclampsia in women with risk factors - ask your obstetrician if you should be on this medication  If you smoke, try to reduce how many cigarettes you smoke or try to quit completely  Do not vape  Other warning signs to watch out for in pregnancy or postpartum: chest pain, obstructed breathing or shortness of breath, seizures, thoughts of hurting yourself or your baby, bleeding, a painful or swollen leg, fever, or headache (see AWHONN POST-BIRTH Warning Signs campaign)  If these happen call 911  Itching is also not normal in pregnancy and if you experience this, especially over your hands and feet, potentially worse at night, notify your doctors

## 2022-11-15 NOTE — PATIENT INSTRUCTIONS
Perineal/Vaginal Massage    Your perineum includes the area at the back of your vagina and vulva and goes to your anus and rectum, and includes the back portion of the birth canal  It is the tissues of your perineum that create a strong pelvic floor, and allow you to walk upright and prevent you from urinating every time you cough  Needless to say, it is important that these tissues are intact and strong, but they also need to be flexible enough to stretch during childbirth to allow the baby to move through the birth canal     Perineal massage during the last several weeks of pregnancy can relax and stretch the tissues of your perineum  This gentle massage keeps the perineal tissues flexible and supple and prepares them to relax and expand naturally during delivery  What can I do now to decrease my chances of tearing during delivery? Massaging around the vaginal opening by you (or your partner), either before birth or during the 2nd stage of labor (massage done by your physician), can reduce the likelihood of perineal tearing during childbirth  Likewise, the use of warm packs held on the perineum during the pushing stage of labor can reduce the severity of your tear  This will happen during the pushing stage of labor  At home, you could also help reduce the chances of injury that may occur during the birth of your child through perineal massage  When should I do this? Starting around or shortly after 34 weeks of pregnancy, your or your partner should provide 5 to 10 minutes of vaginal massage 1 to 4 times per week  What do I use?   Types of lubricant to try:  natural oils, like organic sunflower, grapeseed, coconut, almond, or olive  personal lubricants, like K-Y Jelly, are also a good choice because they’re water soluble  your body’s own vaginal lubricant, if this makes you more comfortable  Whatever you choose, stay away from using synthetic oils or lubricants, like baby oil, mineral oil, or petroleum jelly  How? 1  Find a comfortable position that allows you to reach your perineum, either by reaching your hands in front of you or behind you  For example, a) Sitting propped up in bed with your knees bent, b) squatting against a wall for support with or without the aid of a stack of books or stool, and c) raising one leg such as in the shower or on the toilet  Feel free to use di?erent positions on di?erent days or even change positions during your massage if you become uncomfortable or tired  2  Use either almond, coconut or olive oil and water mixture on 1 or 2 fingers or thumbs, depending on comfort  3  Perineal massage can be done with either or both thumbs, your index or middle ?ngers, or two ?ngers on each hand  4  Insert your thumbs or ?ngers about an inch inside the vagina (up to the first knuckle or just past that), resting your palms against your inside leg  5  Apply sweeping downward inside word pressure from 3 to 9 o'clock for 5 to 10 minutes  Repeat 1 to 4 times a week  6  The goal is to stretch and massage the back portion of the birth canal, down towards the anus and then apart side to side, using more and more pressure over time  Press gently down with your thumbs or ?ngers toward the anus, and then pull them apart from each other and out to the sides  Several times during your massage, hold this stretched position and consciously relax your muscles in this region  Keep massaging down and out to stretch and relax these tissues  Pressure should not be painful, but during the first couple of weeks, it is normal to feel a slight burning or stretching sensation

## 2022-11-18 ENCOUNTER — OFFICE VISIT (OUTPATIENT)
Dept: PHYSICAL THERAPY | Facility: REHABILITATION | Age: 32
End: 2022-11-18

## 2022-11-18 DIAGNOSIS — R29.898 PELVIC GIRDLE WEAKNESS: Primary | ICD-10-CM

## 2022-11-18 NOTE — PROGRESS NOTES
Daily Note     Today's date: 2022  Patient name: Prudence Márquez  : 1990  MRN: [id number]  Referring provider: Shakila Browne, PT  Dx:   Encounter Diagnosis     ICD-10-CM    1  Pelvic girdle weakness  R29 898           Start Time: 08  Stop Time:   Total time in clinic (min): 50 minutes    Subjective:  Patient reports feeling "alright" at start of session, reporting rib pain/SI joint discomfort has been "tolerable, not too much "  She reports no complaints after previous session  Objective: See treatment diary below      Assessment: Tolerated treatment well  Patient demonstrated fatigue post treatment, exhibited good technique with therapeutic exercises and would benefit from continued PT         Plan: Continue per plan of care  Progress treatment as tolerated             Precautions: monitor vitals; watch supine hypotension  [employer] HEP:    Manuals 8/23 9/6 9/13 9/29 10/27 11/18       TrP releases  Assess nv           Assess ASLR   nv           Bilateral Hip Stretching/PROM             STM             K-tape             VITALS             Neuro Re-Ed             TA ADIM 5"x10 10x5"  10x5''           PFMC: Slow Holds 10x x10 x10          TA + hip adduction isometrics 6x 10x5" seated 10x5''  pball  PFMC          TA + hip abduction isometrics 6x GTB 10x5" seated GTB pball 10x5''          TA seated on pball    +alternating marches x10 TA+PFMC x10 TA+PF MC x10       TA+PPT  10x5" 10x5''          Standing PPT + alternating arms    x10                      Ther Ex             Seated piriformis stretch  3x30"  3x30'' 3x30"  3x30"  3x30''       Hamstring stretch seated  3x10"  3x20'' 3x30" 3x30"  3x30''        TA+Theraband rows  BTB 2x10 nv  nv        Quadruped TA    10x5" 10x5"  10x5''       Quadruped TA+leg extensions    x10 x10 x10       Standing sidebending stretch w pball     5x10" b/l 5x10'' b/l       Pball circles    nv 20 ea 20 ea       Pball pelvic tilts     20 ea 20 ea        Ther Activity             Transfer training             Education             TA+PFMC sit to stand   x5          TA+PFMC+lifting    10# to cabinet         TA+PFMC+deadlift  nv nv 8" step 10# x10         TA+PFMC+mini squat  nv x5          Vitals             BP  98/58  110/70 98/52 108/72

## 2022-11-29 ENCOUNTER — ROUTINE PRENATAL (OUTPATIENT)
Dept: OBGYN CLINIC | Facility: CLINIC | Age: 32
End: 2022-11-29

## 2022-11-29 ENCOUNTER — OFFICE VISIT (OUTPATIENT)
Dept: PHYSICAL THERAPY | Facility: REHABILITATION | Age: 32
End: 2022-11-29

## 2022-11-29 VITALS — SYSTOLIC BLOOD PRESSURE: 122 MMHG | DIASTOLIC BLOOD PRESSURE: 70 MMHG | BODY MASS INDEX: 26.78 KG/M2 | WEIGHT: 192 LBS

## 2022-11-29 DIAGNOSIS — Z3A.34 34 WEEKS GESTATION OF PREGNANCY: ICD-10-CM

## 2022-11-29 DIAGNOSIS — R29.898 PELVIC GIRDLE WEAKNESS: Primary | ICD-10-CM

## 2022-11-29 DIAGNOSIS — O43.199 MARGINAL INSERTION OF UMBILICAL CORD AFFECTING MANAGEMENT OF MOTHER: Primary | ICD-10-CM

## 2022-11-29 NOTE — PROGRESS NOTES
Patient would like to discuss when it is safe to start the perineal massages  She states that her bowel movements are a little looser lately and she is wondering if that is normal this far into her pregnancy

## 2022-11-29 NOTE — PROGRESS NOTES
PT Re-Evaluation     Today's date: 2022  Patient name: Chico Benedict  : 1990  MRN: [id number]  Referring provider: Brice Parr PT  Dx:   Encounter Diagnosis     ICD-10-CM    1  Pelvic girdle weakness  R29 [id number]                      Assessment  Assessment details: The patient is a 33 y o  female who is 34 weeks pregnant during her first pregnancy  Her history includes some pelvic floor muscle dysfunction  She presents with some continued pelvic girdle instability in the third trimester of her pregnancy  She is doing well and has been managing some SIJ pain pain well  She has been compliant with exercises  Did talk about starting perineal massage and she was given instructions and a handout  She would benefit from continuing pelvic floor physical therapy once every two weeks up until her due to help reduce/manage pain and prepare her for labor and delivery to maximize her function throughout the remainder of pregnancy  She will be given updated HEP throughout episode of care  Thank you for the referral                   Impairments: abnormal coordination, abnormal or restricted ROM, activity intolerance, impaired physical strength, lacks appropriate home exercise program, pain with function, poor posture  and poor body mechanics  Understanding of Dx/Px/POC: good  Goals  ST  The patient will maintain bilateral hip/pelvic girdle strength to help with her mobility and transfers in 8 to 12 weeks  - met  2  The patient will reduce round ligament pain by 50% frequency in 8 to 12 weeks  - met  3  The patient will be compliant with perineal massage 3-4 times a week for 4 weeks leading up to due date  LT  The patient will be independent with HEP upon discharge to help manage her symptoms and maintain her activity levels upon discharge  2  The patient will have minimal pain with transfers and bed mobility utilizing protective joint strategies for movement upon discharge         Plan  Patient would benefit from: skilled physical therapy  Planned modality interventions: biofeedback  Planned therapy interventions: activity modification, abdominal trunk stabilization, behavior modification, body mechanics training, breathing training, coordination, flexibility, functional ROM exercises, IADL retraining, manual therapy, neuromuscular re-education, patient education, postural training, self care, strengthening, therapeutic activities and therapeutic exercise  Frequency: 1x week  Duration in visits: 6  Duration in weeks: 6  Plan of Care beginning date: 11/29/2022  Plan of Care expiration date: 1/10/2023  Treatment plan discussed with: patient        PT Pelvic Floor Subjective:   History of Present Illness: The patient notes that she had an ultrasound a few weeks ago  She notes that he is measuring around 5 lbs and is currently breech position  She follows up with her OBGYN this evening  She has been doing well overall  She has no leakage of urine  She has some occasional SIJ pain, which she notices when she is on her feet for periods of time  She also has had some right sided rib pain which comes and goes but it is better  Social Support:     Lives with:  Significant other    Work status: employed full time (Physical Therapist)  Diet and Exercise:      Exercise type: combination activity    4-5 days a week  OB/ gyn History    Gestational History:     Number of prior pregnancies: 1    Menstrual History:    Date of last menstrual period: 4/5/2022  Bladder Function:     Voiding Difficulties positive for: frequent urination      Voiding Difficulties negative for: urgency, straining and incomplete emptying      Voiding Difficulties comments:     Voiding frequency: every 1-2 hours    Urinary leakage: no urine leakage    Nocturia (episodes per night): 1 and 0    Painful urination: No      Fluid Intake Type: Water and milk    Intake (ounces):      Intake (ounces) comment: Water: 4, 16 ounce bottles of water a day; goal of 60-80 ounces a day  Gunlock water on occasion  OJ in the morning  Milk - 1 glass every day  Bowel Function:     Bowel Function comments:  Stool is a little more loose   Green-gray in color      Bowel frequency: daily    [place] Stool Scale: type 5  Sexual Function:     Sexually Active:  Sexually active    Pain during intercourse: No    Pain:     At best pain ratin    Location:  SIJ pain - both right and left    Quality:  Sharp    Aggravating factors:  Prolonged positions (movement)      Objective     Static Posture     Shoulders  Rounded  Lumbar Spine   Increased lordosis  Pelvis   Anterior pelvic tilt    Tenderness     Left Hip   No tenderness in the ASIS, PSIS, greater trochanter, pubic tubercle and sacroiliac joint  Right Hip   No tenderness in the ASIS, PSIS, greater trochanter, pubic tubercle and sacroiliac joint       Neurological Testing     Sensation     Lumbar   Left   Intact: light touch    Right   Intact: light touch    Reflexes   Left   Patellar (L4): normal (2+)  Achilles (S1): normal (2+)    Right   Patellar (L4): normal (2+)  Achilles (S1): normal (2+)    Strength/Myotome Testing     Left Hip   Planes of Motion   Flexion: 4  Abduction: 4  Adduction: 4  External rotation: 4-  Internal rotation: 4-    Right Hip   Planes of Motion   Flexion: 4  Abduction: 4  Adduction: 4  External rotation: 4-  Internal rotation: 4-    Left Knee   Flexion: 4  Extension: 4    Right Knee   Flexion: 4  Extension: 4    Left Ankle/Foot   Dorsiflexion: 4    Right Ankle/Foot   Dorsiflexion: 4    Additional Strength Details  Able to heel walk and toe walk    General Comments:      Lumbar Comments  Pelvic Girdle Stability tests:  Tesha SLS: R: some instability noted  L: some instability noted              Precautions: monitor vitals; watch supine hypotension  [employer] HEP:    Manuals 8/23 9/6 9/13 9/29 10/27 11/18 11/29      TrP releases  Assess nv           Assess ASLR   nv           Bilateral Hip Stretching/PROM       10 min      STM             K-tape             VITALS             Neuro Re-Ed             TA ADIM 5"x10 10x5"  10x5''           PFMC: Slow Holds 10x x10 x10          TA + hip adduction isometrics 6x 10x5" seated 10x5''  pball  PFMC          TA + hip abduction isometrics 6x GTB 10x5" seated GTB pball 10x5''          TA seated on pball    +alternating marches x10 TA+PFMC x10 TA+PF MC x10       TA+PPT  10x5" 10x5''          Standing PPT + alternating arms    x10                      Ther Ex             Seated piriformis stretch  3x30"  3x30'' 3x30"  3x30"  3x30'' 3x30"       Hamstring stretch seated  3x10"  3x20'' 3x30" 3x30"  3x30''  3x30"      TA+Theraband rows  BTB 2x10 nv  nv        Quadruped TA    10x5" 10x5"  10x5''       Quadruped TA+leg extensions    x10 x10 x10       Standing sidebending stretch w pball     5x10" b/l 5x10'' b/l       Pball circles    nv 20 ea 20 ea 20x ea      Pball pelvic tilts     20 ea 20 ea  20x ea      Ther Activity             Transfer training             Education       15 min      TA+PFMC sit to stand   x5          TA+PFMC+lifting    10# to cabinet         TA+PFMC+deadlift  nv nv 8" step 10# x10         TA+PFMC+mini squat  nv x5          Vitals             BP  98/58  110/70 98/52 108/72 118/68 mmHg

## 2022-11-29 NOTE — PROGRESS NOTES
33 y o    female at 28 1 wga for PNV  BP : 122/70  TW  Feeling well  No complaints  Seeing pelvic floor PT    OK to start perineal massage  Vtx by US  Growth in 2 weeks  F/u 2weeks

## 2022-12-13 ENCOUNTER — OFFICE VISIT (OUTPATIENT)
Dept: PHYSICAL THERAPY | Facility: REHABILITATION | Age: 32
End: 2022-12-13

## 2022-12-13 ENCOUNTER — ROUTINE PRENATAL (OUTPATIENT)
Dept: OBGYN CLINIC | Facility: CLINIC | Age: 32
End: 2022-12-13

## 2022-12-13 VITALS — BODY MASS INDEX: 27.53 KG/M2 | DIASTOLIC BLOOD PRESSURE: 82 MMHG | WEIGHT: 197.4 LBS | SYSTOLIC BLOOD PRESSURE: 120 MMHG

## 2022-12-13 DIAGNOSIS — Z3A.36 36 WEEKS GESTATION OF PREGNANCY: Primary | ICD-10-CM

## 2022-12-13 DIAGNOSIS — R29.898 PELVIC GIRDLE WEAKNESS: Primary | ICD-10-CM

## 2022-12-13 DIAGNOSIS — O43.199 MARGINAL INSERTION OF UMBILICAL CORD AFFECTING MANAGEMENT OF MOTHER: ICD-10-CM

## 2022-12-13 NOTE — PROGRESS NOTES
33 y o  Sana Chaney female at 36w1d (Estimated Date of Delivery: 23) for PNV  Pre-[facility] Vitals    Flowsheet Row Most Recent Value   Prenatal Assessment    Fetal Heart Rate 150   Movement Present   Presentation Vertex   Prenatal Vitals    Blood Pressure 120/82   Weight - Scale 89 5 kg (197 lb 6 4 oz)   Urine Albumin/Glucose    Dilation/Effacement/Station    Cervical Dilation 1   Cervical Effacement 50   Fetal Station -2   Vaginal Drainage    Edema         TW 5 kg (38 lb 10 7 oz)  GBS done: Yes  PCN allergy: No  GC collected  Some cramping  No LOF or bleeding  Good FM  Growth US tomorrow  SVE /-2, soft, mid  RTO in 2 weeks  Labor precautions given  [address] reviewed

## 2022-12-13 NOTE — PROGRESS NOTES
Daily Note     Today's date: 2022  Patient name: Michael Simons  : 1990  MRN: [id number]  Referring provider: Andrea Garcia, PT  Dx:   Encounter Diagnosis     ICD-10-CM    1  Pelvic girdle weakness  R29 [id number]           Start Time: 1700  Stop Time: 1800  Total time in clinic (min): 60 minutes    Subjective: The patient notes that she saw her OBGYN today and she is about 1 cm dilated  She notes that she is feeling good overall, just some lower thoracic pain  She is 36 weeks pregnant this week  She has her ultrasound tomorrow to check positioning  She has been having some [place] contractions but these subside with rest  BP: 110/72 mmHg      Objective: See treatment diary below      Assessment: Tolerated treatment well  Patient is doing very well overall  No complaints of pain during transfers or bed mobility  She does have some tightness in her hips  Worked on some gentle core stabilization and discussed relaxation techniques for labor including pelvic floor muscle relaxation/lengthening during active labor phase  She will return in 2 weeks for her final scheduled PT visit prior to due date  Plan: Continue per plan of care        Precautions: monitor vitals; watch supine hypotension  [employer] HEP:    Manuals 8/23 9/6 9/13 9/29 10/27 11/18 11/29 12/13     TrP releases  Assess nv           Assess ASLR   nv           Bilateral Hip Stretching/PROM       10 min 10 min     STM        To thoroaclumbar region  8 min     K-tape             VITALS             Neuro Re-Ed             TA ADIM 5"x10 10x5"  10x5''      10"x5      PFMC: Slow Holds 10x x10 x10     10x      TA + hip adduction isometrics 6x 10x5" seated 10x5''  pball  PFMC     10x5"  seated     TA + hip abduction isometrics 6x GTB 10x5" seated GTB pball 10x5''     PTB   10x seated     TA seated on pball    +alternating marches x10 TA+PFMC x10 TA+PF MC x10       TA+PPT  10x5" 10x5''          Standing PPT + alternating arms    x10 Ther Ex             Seated piriformis stretch  3x30"  3x30'' 3x30"  3x30"  3x30'' 3x30"  3x30" ea     Hamstring stretch seated  3x10"  3x20'' 3x30" 3x30"  3x30''  3x30"      TA+Theraband rows  BTB 2x10 nv  nv        Quadruped TA    10x5" 10x5"  10x5''       Quadruped TA+leg extensions    x10 x10 x10       Standing sidebending stretch w pball     5x10" b/l 5x10'' b/l       Pball circles    nv 20 ea 20 ea 20x ea 20x ea     Pball pelvic tilts     20 ea 20 ea  20x ea      Ther Activity             Transfer training             Education       15 min      TA+PFMC sit to stand   x5          TA+PFMC+lifting    10# to cabinet         TA+PFMC+deadlift  nv nv 8" step 10# x10         TA+PFMC+mini squat  nv x5          Vitals             BP  98/58  110/70 98/52 108/72 118/68 mmHg 110/72 mmHg

## 2022-12-14 ENCOUNTER — ULTRASOUND (OUTPATIENT)
Facility: HOSPITAL | Age: 32
End: 2022-12-14

## 2022-12-14 VITALS
HEART RATE: 72 BPM | WEIGHT: 195 LBS | DIASTOLIC BLOOD PRESSURE: 84 MMHG | BODY MASS INDEX: 27.3 KG/M2 | HEIGHT: 71 IN | SYSTOLIC BLOOD PRESSURE: 120 MMHG

## 2022-12-14 DIAGNOSIS — Z3A.36 36 WEEKS GESTATION OF PREGNANCY: ICD-10-CM

## 2022-12-14 DIAGNOSIS — O43.199 MARGINAL INSERTION OF UMBILICAL CORD AFFECTING MANAGEMENT OF MOTHER: Primary | ICD-10-CM

## 2022-12-14 LAB
C TRACH DNA SPEC QL NAA+PROBE: NEGATIVE
N GONORRHOEA DNA SPEC QL NAA+PROBE: NEGATIVE

## 2022-12-14 NOTE — PROGRESS NOTES
The patient was seen today for an ultrasound  Please see ultrasound report (located under Ob Procedures) for additional details  Thank you very much for allowing us to participate in the care of this very nice patient  Should you have any questions, please do not hesitate to contact me  Lei Walker MD [address]  Attending Physician, [facility]

## 2022-12-15 LAB — GP B STREP DNA SPEC QL NAA+PROBE: POSITIVE

## 2022-12-16 NOTE — RESULT ENCOUNTER NOTE
Hello! Your GBS culture is positive  You will need treatment with antibiotics in labor  Your chlamydia/gonorrhea test is negative  Please contact the office at [phone] with any questions   Debora

## 2022-12-23 ENCOUNTER — ROUTINE PRENATAL (OUTPATIENT)
Dept: OBGYN CLINIC | Facility: CLINIC | Age: 32
End: 2022-12-23

## 2022-12-23 VITALS — SYSTOLIC BLOOD PRESSURE: 120 MMHG | WEIGHT: 196 LBS | BODY MASS INDEX: 27.34 KG/M2 | DIASTOLIC BLOOD PRESSURE: 70 MMHG

## 2022-12-23 DIAGNOSIS — Z34.03 ENCOUNTER FOR SUPERVISION OF NORMAL FIRST PREGNANCY IN THIRD TRIMESTER: Primary | ICD-10-CM

## 2022-12-23 DIAGNOSIS — Z3A.37 37 WEEKS GESTATION OF PREGNANCY: ICD-10-CM

## 2022-12-23 NOTE — PROGRESS NOTES
33 y o   female at 37w4d (Estimated Date of Delivery: 23) for PNV  Pre-[facility]ling Vitals    Flowsheet Row Most Recent Value   Prenatal Assessment    Fetal Heart Rate 155   Movement Present   Presentation Vertex   Prenatal Vitals    Blood Pressure 120/70   Weight - Scale 88 9 kg (196 lb)   Urine Albumin/Glucose    Dilation/Effacement/Station    Cervical Dilation 1 5   Cervical Effacement 50   Fetal Station -2   Vaginal Drainage    Edema         TW 9 kg (37 lb 4 3 oz)  She has irregular contractions and pressure  No lof or bleeding  Good FM  Not interested in IOL at this time, will discuss and consider after 39-40wks  GBS positive status discussed, NKDA  RTO in one week

## 2022-12-23 NOTE — PROGRESS NOTES
Pt is here for a routine prenatal, will complete a cervical check  Pt is well, states that pelvic pressure has increased and  discuss GBS results
